# Patient Record
Sex: FEMALE | Race: BLACK OR AFRICAN AMERICAN | NOT HISPANIC OR LATINO
[De-identification: names, ages, dates, MRNs, and addresses within clinical notes are randomized per-mention and may not be internally consistent; named-entity substitution may affect disease eponyms.]

---

## 2017-03-03 ENCOUNTER — PRIOR ORIGINAL RECORDS (OUTPATIENT)
Dept: OTHER | Age: 67
End: 2017-03-03

## 2017-03-03 LAB
BASO+EOS+MONOS # BLD: 0.3 K/MCL (ref 0.1–1.1)
BASO+EOS+MONOS NFR BLD: 8 %
BASOPHILS # BLD: 0 K/MCL (ref 0–0.3)
BASOPHILS NFR BLD: 1 % (ref 0–2)
DIFFERENTIAL METHOD BLD: ABNORMAL
DIFFERENTIAL METHOD BLD: ABNORMAL
EOSINOPHIL # BLD: 0.1 K/MCL (ref 0.1–0.5)
EOSINOPHIL NFR BLD: 3 % (ref 0–6)
ERYTHROCYTE [DISTWIDTH] IN BLOOD: 16.6 % (ref 11–15)
ERYTHROCYTE [DISTWIDTH] IN BLOOD: 16.9 % (ref 11–15)
HCT VFR BLD CALC: 28.6 % (ref 36–46.5)
HCT VFR BLD CALC: 29.2 % (ref 36–46.5)
HGB BLD-MCNC: 9.4 G/DL (ref 12–15.5)
HGB BLD-MCNC: 9.5 G/DL (ref 12–15.5)
LYMPHOCYTES # BLD: 2.1 K/MCL (ref 1–4)
LYMPHOCYTES # BLD: 2.1 K/MCL (ref 1–4)
LYMPHOCYTES NFR BLD: 47 %
LYMPHOCYTES NFR BLD: 48 %
MCH RBC QN AUTO: 41 PG (ref 26–34)
MCH RBC QN AUTO: 41.9 PG (ref 26–34)
MCHC RBC AUTO-ENTMCNC: 32.2 G/DL (ref 32–36.5)
MCHC RBC AUTO-ENTMCNC: 33.2 G/DL (ref 32–36.5)
MCV RBC AUTO: 126 FL (ref 78–100)
MCV RBC AUTO: 127.5 FL (ref 78–100)
MONOCYTES # BLD: 0.2 K/MCL (ref 0.3–0.9)
MONOCYTES NFR BLD: 6 %
NEUTROPHILS # BLD: 1.9 K/MCL (ref 1.8–7.7)
NEUTROPHILS # BLD: 2 K/MCL (ref 1.8–7.7)
NEUTROPHILS NFR BLD: 43 %
NEUTROPHILS NFR BLD: 44 %
NRBC, AUTO % (NRBCRE): 0 %
PLATELET # BLD: 562 K/MCL (ref 140–450)
PLATELET # BLD: 609 K/MCL (ref 140–450)
RBC # BLD: 2.27 MIL/MCL (ref 4–5.2)
RBC # BLD: 2.29 MIL/MCL (ref 4–5.2)
WBC # BLD: 4.3 K/MCL (ref 4.2–11)
WBC # BLD: 4.4 K/MCL (ref 4.2–11)

## 2017-03-03 ASSESSMENT — PAIN SCALES - GENERAL: PAINLEVEL_OUTOF10: 5

## 2017-03-08 ENCOUNTER — HOSPITAL (OUTPATIENT)
Dept: OTHER | Age: 67
End: 2017-03-08
Attending: FAMILY MEDICINE

## 2017-03-22 ENCOUNTER — HOSPITAL (OUTPATIENT)
Dept: OTHER | Age: 67
End: 2017-03-22
Attending: FAMILY MEDICINE

## 2017-07-14 ENCOUNTER — PRIOR ORIGINAL RECORDS (OUTPATIENT)
Dept: OTHER | Age: 67
End: 2017-07-14

## 2017-07-14 LAB
BASO+EOS+MONOS # BLD: 0.4 K/MCL (ref 0.1–1.1)
BASO+EOS+MONOS NFR BLD: 7 %
DIFFERENTIAL METHOD BLD: ABNORMAL
ERYTHROCYTE [DISTWIDTH] IN BLOOD: 17.4 % (ref 11–15)
HCT VFR BLD CALC: 27 % (ref 36–46.5)
HGB BLD-MCNC: 8.9 G/DL (ref 12–15.5)
LYMPHOCYTES # BLD: 2.8 K/MCL (ref 1–4)
LYMPHOCYTES NFR BLD: 46 %
MCH RBC QN AUTO: 41.8 PG (ref 26–34)
MCHC RBC AUTO-ENTMCNC: 33 G/DL (ref 32–36.5)
MCV RBC AUTO: 126.8 FL (ref 78–100)
NEUTROPHILS # BLD: 3 K/MCL (ref 1.8–7.7)
NEUTROPHILS NFR BLD: 47 %
PLATELET # BLD: 555 K/MCL (ref 140–450)
RBC # BLD: 2.13 MIL/MCL (ref 4–5.2)
WBC # BLD: 6.2 K/MCL (ref 4.2–11)

## 2017-07-14 ASSESSMENT — PAIN SCALES - GENERAL: PAINLEVEL_OUTOF10: 4

## 2017-08-18 ENCOUNTER — CHARTING TRANS (OUTPATIENT)
Dept: OTHER | Age: 67
End: 2017-08-18

## 2018-01-12 ENCOUNTER — PRIOR ORIGINAL RECORDS (OUTPATIENT)
Dept: OTHER | Age: 68
End: 2018-01-12

## 2018-01-12 LAB
BASO+EOS+MONOS # BLD: 0.3 K/MCL (ref 0.1–1.1)
BASO+EOS+MONOS NFR BLD: 7 %
DIFFERENTIAL METHOD BLD: ABNORMAL
ERYTHROCYTE [DISTWIDTH] IN BLOOD: 16.7 % (ref 11–15)
HCT VFR BLD CALC: 29.9 % (ref 36–46.5)
HGB BLD-MCNC: 9.5 G/DL (ref 12–15.5)
LYMPHOCYTES # BLD: 2.2 K/MCL (ref 1–4)
LYMPHOCYTES NFR BLD: 41 %
MCH RBC QN AUTO: 40.8 PG (ref 26–34)
MCHC RBC AUTO-ENTMCNC: 31.8 G/DL (ref 32–36.5)
MCV RBC AUTO: 128.3 FL (ref 78–100)
NEUTROPHILS # BLD: 2.8 K/MCL (ref 1.8–7.7)
NEUTROPHILS NFR BLD: 52 %
PLATELET # BLD: 647 K/MCL (ref 140–450)
RBC # BLD: 2.33 MIL/MCL (ref 4–5.2)
WBC # BLD: 5.3 K/MCL (ref 4.2–11)

## 2018-01-12 ASSESSMENT — PAIN SCALES - GENERAL: PAINLEVEL_OUTOF10: 0

## 2018-02-20 ENCOUNTER — LAB SERVICES (OUTPATIENT)
Dept: OTHER | Age: 68
End: 2018-02-20

## 2018-02-20 ENCOUNTER — CHARTING TRANS (OUTPATIENT)
Dept: OTHER | Age: 68
End: 2018-02-20

## 2018-03-06 ENCOUNTER — CHARTING TRANS (OUTPATIENT)
Dept: OTHER | Age: 68
End: 2018-03-06

## 2018-03-06 LAB
25(OH)D3+25(OH)D2 SERPL-MCNC: 53 NG/ML (ref 30–100)
ALBUMIN SERPL-MCNC: 4.1 G/DL (ref 3.6–5.1)
ALBUMIN/GLOB SERPL: 1.1 (ref 1–2.4)
ALP SERPL-CCNC: 60 UNITS/L (ref 45–117)
ALT SERPL-CCNC: 18 UNITS/L
ANA SER QL IA: NEGATIVE
ANION GAP SERPL CALC-SCNC: 16 MMOL/L (ref 10–20)
APPEARANCE UR: NORMAL
AST SERPL-CCNC: 13 UNITS/L
BACTERIA #/AREA URNS HPF: NORMAL /HPF
BASOPHILS # BLD: 0 K/MCL (ref 0–0.3)
BASOPHILS NFR BLD: 0 %
BILIRUB SERPL-MCNC: 0.2 MG/DL (ref 0.2–1)
BILIRUB UR QL: NEGATIVE
BUN SERPL-MCNC: 13 MG/DL (ref 6–20)
BUN/CREAT SERPL: 20 (ref 7–25)
CALCIUM SERPL-MCNC: 8.8 MG/DL (ref 8.4–10.2)
CAOX CRY URNS QL MICRO: PRESENT
CCP AB SER IA-ACNC: 6 UNITS
CHLORIDE SERPL-SCNC: 109 MMOL/L (ref 98–107)
CO2 SERPL-SCNC: 23 MMOL/L (ref 21–32)
COLOR UR: YELLOW
CREAT SERPL-MCNC: 0.66 MG/DL (ref 0.51–0.95)
CRP SERPL-MCNC: <0.3 MG/DL
DIFFERENTIAL METHOD BLD: ABNORMAL
ELECTROPHORESIS (ELECTROPHO): NORMAL
ELECTROPHORESIS (ELECTROPHO): NORMAL
EOSINOPHIL # BLD: 0.1 K/MCL (ref 0.1–0.5)
EOSINOPHIL NFR BLD: 1 %
ERYTHROCYTE [DISTWIDTH] IN BLOOD: 16.5 % (ref 11–15)
ERYTHROCYTE [SEDIMENTATION RATE] IN BLOOD BY WESTERGREN METHOD: 31 MM/HR (ref 0–20)
GLOBULIN SER-MCNC: 3.8 G/DL (ref 2–4)
GLUCOSE SERPL-MCNC: 185 MG/DL (ref 65–99)
GLUCOSE UR-MCNC: NEGATIVE MG/DL
HEMATOCRIT: 29.8 % (ref 36–46.5)
HEMOGLOBIN: 9.6 G/DL (ref 12–15.5)
HYALINE CASTS #/AREA URNS LPF: NORMAL /LPF (ref 0–5)
IGA SERPL-MCNC: 436 MG/DL (ref 82–453)
IGG SERPL-MCNC: 1300 MG/DL (ref 751–1560)
IGM SERPL-MCNC: 80 MG/DL (ref 46–304)
KAPPA LC FREE SER NEPH-MCNC: 1.87 MG/DL (ref 0.33–1.94)
KAPPA LC/LAMBDA SER: 0.74 (ref 0.26–1.65)
KETONES UR-MCNC: NEGATIVE MG/DL
LAMBDA LC FREE SER NEPH-MCNC: 2.52 MG/DL (ref 0.57–2.63)
LENGTH OF FAST TIME PATIENT: 12 HRS
LYMPHOCYTES # BLD: 1.9 K/MCL (ref 1–4)
LYMPHOCYTES NFR BLD: 42 %
M PROTEIN 1 SERPL ELPH-MCNC: NORMAL G/DL
M PROTEIN 2 SERPL ELPH-MCNC: NORMAL G/DL
MEAN CORPUSCULAR HEMOGLOBIN: 41.4 PG (ref 26–34)
MEAN CORPUSCULAR HGB CONC: 32.2 G/DL (ref 32–36.5)
MEAN CORPUSCULAR VOLUME: 128.4 FL (ref 78–100)
MONOCYTES # BLD: 0.3 K/MCL (ref 0.3–0.9)
MONOCYTES NFR BLD: 7 %
MUCOUS THREADS URNS QL MICRO: PRESENT
NEUTROPHILS # BLD: 2.3 K/MCL (ref 1.8–7.7)
NEUTROPHILS NFR BLD: 50 %
NITRITE UR QL: NEGATIVE
PATH INTERP SPEC-IMP: NORMAL
PATH SERP REV: NORMAL
PH UR: 5 UNITS (ref 5–7)
PLATELET COUNT: 622 K/MCL (ref 140–450)
POTASSIUM SERPL-SCNC: 4.5 MMOL/L (ref 3.4–5.1)
PROT ?TM UR-MCNC: <6 MG/DL
PROT SERPL-MCNC: 8 G/DL (ref 6.4–8.2)
PROT UR QL: NEGATIVE MG/DL
PTH-INTACT SERPL-MCNC: 61 PG/ML (ref 19–88)
RBC #/AREA URNS HPF: NORMAL /HPF (ref 0–3)
RBC-URINE: NEGATIVE
RED CELL COUNT: 2.32 MIL/MCL (ref 4–5.2)
RF IGA SER-ACNC: 8 UNITS
RF IGG SER-ACNC: <5 UNITS
RF IGM SER-ACNC: 8 UNITS
SODIUM SERPL-SCNC: 143 MMOL/L (ref 135–145)
SP GR UR: 1.02 (ref 1–1.03)
SPECIMEN SOURCE: NORMAL
SQUAMOUS #/AREA URNS HPF: NORMAL /HPF (ref 0–5)
TOTAL PROTEIN: 7.9 G/DL (ref 6.4–8.2)
TSH SERPL-ACNC: 1.63 MCUNITS/ML (ref 0.35–5)
UROBILINOGEN UR QL: 0.2 MG/DL (ref 0–1)
WBC #/AREA URNS HPF: NORMAL /HPF (ref 0–5)
WBC-URINE: NEGATIVE
WHITE BLOOD COUNT: 4.6 K/MCL (ref 4.2–11)

## 2018-03-26 ENCOUNTER — HOSPITAL (OUTPATIENT)
Dept: OTHER | Age: 68
End: 2018-03-26
Attending: FAMILY MEDICINE

## 2018-07-06 ENCOUNTER — PRIOR ORIGINAL RECORDS (OUTPATIENT)
Dept: OTHER | Age: 68
End: 2018-07-06

## 2018-07-06 LAB
BASO+EOS+MONOS # BLD: 0.4 K/MCL (ref 0.1–1.1)
BASO+EOS+MONOS NFR BLD: 9 %
DIFFERENTIAL METHOD BLD: ABNORMAL
ERYTHROCYTE [DISTWIDTH] IN BLOOD: 16.2 % (ref 11–15)
HCT VFR BLD CALC: 29.5 % (ref 36–46.5)
HGB BLD-MCNC: 9.5 G/DL (ref 12–15.5)
LYMPHOCYTES # BLD: 1.9 K/MCL (ref 1–4)
LYMPHOCYTES NFR BLD: 36 %
MCH RBC QN AUTO: 42 PG (ref 26–34)
MCHC RBC AUTO-ENTMCNC: 32.2 G/DL (ref 32–36.5)
MCV RBC AUTO: 130.5 FL (ref 78–100)
NEUTROPHILS # BLD: 2.8 K/MCL (ref 1.8–7.7)
NEUTROPHILS NFR BLD: 55 %
PLATELET # BLD: 579 K/MCL (ref 140–450)
RBC # BLD: 2.26 MIL/MCL (ref 4–5.2)
WBC # BLD: 5.1 K/MCL (ref 4.2–11)

## 2018-07-06 ASSESSMENT — PAIN SCALES - GENERAL: PAINLEVEL_OUTOF10: 4

## 2018-08-14 ENCOUNTER — CHARTING TRANS (OUTPATIENT)
Dept: OTHER | Age: 68
End: 2018-08-14

## 2018-11-01 VITALS
DIASTOLIC BLOOD PRESSURE: 68 MMHG | HEART RATE: 90 BPM | HEIGHT: 64 IN | SYSTOLIC BLOOD PRESSURE: 122 MMHG | TEMPERATURE: 97.7 F | WEIGHT: 185 LBS | BODY MASS INDEX: 31.58 KG/M2

## 2018-11-01 VITALS
WEIGHT: 186.4 LBS | TEMPERATURE: 97.2 F | SYSTOLIC BLOOD PRESSURE: 134 MMHG | DIASTOLIC BLOOD PRESSURE: 76 MMHG | HEART RATE: 78 BPM

## 2018-11-03 VITALS
WEIGHT: 195.11 LBS | HEART RATE: 85 BPM | HEIGHT: 64 IN | BODY MASS INDEX: 33.31 KG/M2 | SYSTOLIC BLOOD PRESSURE: 131 MMHG | DIASTOLIC BLOOD PRESSURE: 72 MMHG

## 2018-11-05 ENCOUNTER — CHARTING TRANS (OUTPATIENT)
Dept: OTHER | Age: 68
End: 2018-11-05

## 2018-11-13 ENCOUNTER — CHARTING TRANS (OUTPATIENT)
Dept: OTHER | Age: 68
End: 2018-11-13

## 2018-11-27 VITALS
BODY MASS INDEX: 32.18 KG/M2 | HEIGHT: 64 IN | SYSTOLIC BLOOD PRESSURE: 136 MMHG | WEIGHT: 188.49 LBS | HEART RATE: 98 BPM | DIASTOLIC BLOOD PRESSURE: 73 MMHG

## 2018-12-07 VITALS
DIASTOLIC BLOOD PRESSURE: 79 MMHG | HEART RATE: 83 BPM | BODY MASS INDEX: 32.94 KG/M2 | SYSTOLIC BLOOD PRESSURE: 136 MMHG | WEIGHT: 191.8 LBS | RESPIRATION RATE: 16 BRPM

## 2019-01-01 ENCOUNTER — EXTERNAL RECORD (OUTPATIENT)
Dept: HEALTH INFORMATION MANAGEMENT | Facility: OTHER | Age: 69
End: 2019-01-01

## 2019-01-04 ENCOUNTER — PRIOR ORIGINAL RECORDS (OUTPATIENT)
Dept: OTHER | Age: 69
End: 2019-01-04

## 2019-01-04 LAB
BASO+EOS+MONOS # BLD: 0.5 K/MCL (ref 0.1–1.1)
BASO+EOS+MONOS NFR BLD: 11 %
DIFFERENTIAL METHOD BLD: ABNORMAL
ERYTHROCYTE [DISTWIDTH] IN BLOOD: 15.8 % (ref 11–15)
HCT VFR BLD CALC: 28.8 % (ref 36–46.5)
HGB BLD-MCNC: 9.7 G/DL (ref 12–15.5)
LYMPHOCYTES # BLD: 1.9 K/MCL (ref 1–4)
LYMPHOCYTES NFR BLD: 42 %
MCH RBC QN AUTO: 42 PG (ref 26–34)
MCHC RBC AUTO-ENTMCNC: 33.7 G/DL (ref 32–36.5)
MCV RBC AUTO: 124.7 FL (ref 78–100)
NEUTROPHILS # BLD: 2.1 K/MCL (ref 1.8–7.7)
NEUTROPHILS NFR BLD: 47 %
PLATELET # BLD: 537 K/MCL (ref 140–450)
RBC # BLD: 2.31 MIL/MCL (ref 4–5.2)
WBC # BLD: 4.5 K/MCL (ref 4.2–11)

## 2019-01-04 ASSESSMENT — PAIN SCALES - GENERAL: PAINLEVEL_OUTOF10: 0

## 2019-01-24 RX ORDER — ALPRAZOLAM 0.5 MG/1
TABLET ORAL
COMMUNITY
Start: 2018-11-13 | End: 2019-02-12 | Stop reason: ALTCHOICE

## 2019-01-24 RX ORDER — ALENDRONATE SODIUM 70 MG/1
TABLET ORAL
COMMUNITY
Start: 2018-04-01 | End: 2019-04-01

## 2019-01-24 RX ORDER — LIDOCAINE 50 MG/G
PATCH TOPICAL
COMMUNITY
Start: 2018-03-06 | End: 2019-03-06

## 2019-01-24 RX ORDER — PROPRANOLOL HYDROCHLORIDE 20 MG/1
TABLET ORAL
COMMUNITY
Start: 2018-08-14 | End: 2019-02-12 | Stop reason: SDUPTHER

## 2019-01-24 RX ORDER — GABAPENTIN 300 MG/1
CAPSULE ORAL
COMMUNITY

## 2019-02-12 ENCOUNTER — OFFICE VISIT (OUTPATIENT)
Dept: NEUROLOGY | Age: 69
End: 2019-02-12

## 2019-02-12 VITALS
HEART RATE: 75 BPM | BODY MASS INDEX: 33.22 KG/M2 | DIASTOLIC BLOOD PRESSURE: 61 MMHG | WEIGHT: 187.5 LBS | HEIGHT: 63 IN | SYSTOLIC BLOOD PRESSURE: 119 MMHG

## 2019-02-12 DIAGNOSIS — E08.42 DIABETIC POLYNEUROPATHY ASSOCIATED WITH DIABETES MELLITUS DUE TO UNDERLYING CONDITION (CMD): ICD-10-CM

## 2019-02-12 DIAGNOSIS — F41.9 ANXIETY: ICD-10-CM

## 2019-02-12 DIAGNOSIS — G25.0 BENIGN ESSENTIAL TREMOR: Primary | ICD-10-CM

## 2019-02-12 PROCEDURE — 3074F SYST BP LT 130 MM HG: CPT | Performed by: PSYCHIATRY & NEUROLOGY

## 2019-02-12 PROCEDURE — 99213 OFFICE O/P EST LOW 20 MIN: CPT | Performed by: PSYCHIATRY & NEUROLOGY

## 2019-02-12 PROCEDURE — 3078F DIAST BP <80 MM HG: CPT | Performed by: PSYCHIATRY & NEUROLOGY

## 2019-02-12 RX ORDER — ERGOCALCIFEROL 1.25 MG/1
50000 CAPSULE ORAL
COMMUNITY

## 2019-02-12 RX ORDER — NIFEDIPINE 60 MG/1
60 TABLET, FILM COATED, EXTENDED RELEASE ORAL DAILY
COMMUNITY

## 2019-02-12 RX ORDER — LOVASTATIN 40 MG/1
40 TABLET ORAL NIGHTLY
COMMUNITY

## 2019-02-12 RX ORDER — LANOLIN ALCOHOL/MO/W.PET/CERES
1000 CREAM (GRAM) TOPICAL DAILY
COMMUNITY

## 2019-02-12 RX ORDER — PROPRANOLOL HYDROCHLORIDE 20 MG/1
40 TABLET ORAL DAILY
Qty: 60 TABLET | Refills: 11 | Status: SHIPPED | OUTPATIENT
Start: 2019-02-12 | End: 2020-02-12

## 2019-02-12 RX ORDER — MELATONIN
1000 DAILY
COMMUNITY

## 2019-03-28 ENCOUNTER — HOSPITAL (OUTPATIENT)
Dept: OTHER | Age: 69
End: 2019-03-28
Attending: OBSTETRICS & GYNECOLOGY

## 2019-04-05 ENCOUNTER — HOSPITAL (OUTPATIENT)
Dept: OTHER | Age: 69
End: 2019-04-05

## 2019-07-19 ENCOUNTER — PRIOR ORIGINAL RECORDS (OUTPATIENT)
Dept: OTHER | Age: 69
End: 2019-07-19

## 2019-07-19 LAB
BASO+EOS+MONOS # BLD: 0.6 K/MCL (ref 0.1–1.1)
BASO+EOS+MONOS NFR BLD: 12 %
DIFFERENTIAL METHOD BLD: ABNORMAL
ERYTHROCYTE [DISTWIDTH] IN BLOOD: 15.5 % (ref 11–15)
HCT VFR BLD CALC: 31.5 % (ref 36–46.5)
HGB BLD-MCNC: 10.2 G/DL (ref 12–15.5)
LYMPHOCYTES # BLD: 2.2 K/MCL (ref 1–4)
LYMPHOCYTES NFR BLD: 49 %
MCH RBC QN AUTO: 41.5 PG (ref 26–34)
MCHC RBC AUTO-ENTMCNC: 32.4 G/DL (ref 32–36.5)
MCV RBC AUTO: 128 FL (ref 78–100)
NEUTROPHILS # BLD: 1.7 K/MCL (ref 1.8–7.7)
NEUTROPHILS NFR BLD: 39 %
PLATELET # BLD: 534 K/MCL (ref 140–450)
RBC # BLD: 2.46 MIL/MCL (ref 4–5.2)
WBC # BLD: 4.5 K/MCL (ref 4.2–11)

## 2019-07-19 ASSESSMENT — PAIN SCALES - GENERAL: PAINLEVEL_OUTOF10: 0

## 2019-10-14 ENCOUNTER — TELEPHONE (OUTPATIENT)
Dept: ENDOCRINOLOGY CLINIC | Facility: CLINIC | Age: 69
End: 2019-10-14

## 2019-10-14 ENCOUNTER — HOSPITAL ENCOUNTER (EMERGENCY)
Facility: HOSPITAL | Age: 69
Discharge: HOME | End: 2019-10-14
Attending: EMERGENCY MEDICINE
Payer: MEDICARE

## 2019-10-14 VITALS
OXYGEN SATURATION: 100 % | DIASTOLIC BLOOD PRESSURE: 76 MMHG | BODY MASS INDEX: 32.07 KG/M2 | WEIGHT: 181 LBS | SYSTOLIC BLOOD PRESSURE: 122 MMHG | TEMPERATURE: 98 F | HEART RATE: 73 BPM | RESPIRATION RATE: 18 BRPM | HEIGHT: 63 IN

## 2019-10-14 DIAGNOSIS — R73.9 HYPERGLYCEMIA: Primary | ICD-10-CM

## 2019-10-14 PROCEDURE — 85060 BLOOD SMEAR INTERPRETATION: CPT | Performed by: EMERGENCY MEDICINE

## 2019-10-14 PROCEDURE — 82962 GLUCOSE BLOOD TEST: CPT

## 2019-10-14 PROCEDURE — 81003 URINALYSIS AUTO W/O SCOPE: CPT | Performed by: EMERGENCY MEDICINE

## 2019-10-14 PROCEDURE — 96372 THER/PROPH/DIAG INJ SC/IM: CPT

## 2019-10-14 PROCEDURE — 96360 HYDRATION IV INFUSION INIT: CPT

## 2019-10-14 PROCEDURE — 99284 EMERGENCY DEPT VISIT MOD MDM: CPT

## 2019-10-14 PROCEDURE — 85025 COMPLETE CBC W/AUTO DIFF WBC: CPT | Performed by: EMERGENCY MEDICINE

## 2019-10-14 PROCEDURE — 80048 BASIC METABOLIC PNL TOTAL CA: CPT | Performed by: EMERGENCY MEDICINE

## 2019-10-14 RX ORDER — GLIPIZIDE 5 MG/1
5 TABLET ORAL
Qty: 60 TABLET | Refills: 0 | Status: SHIPPED | OUTPATIENT
Start: 2019-10-14 | End: 2019-11-18

## 2019-10-14 RX ORDER — INSULIN ASPART 100 [IU]/ML
0.15 INJECTION, SOLUTION INTRAVENOUS; SUBCUTANEOUS ONCE
Status: COMPLETED | OUTPATIENT
Start: 2019-10-14 | End: 2019-10-14

## 2019-10-15 NOTE — TELEPHONE ENCOUNTER
Patient was in th ER, please book consult apt with ALBER Carey  Also please call for Bg  MTf 1000 BID and Glipizide 5 mg BID  thanks

## 2019-10-15 NOTE — ED PROVIDER NOTES
Patient Seen in: [facility] AND [facility] Emergency Department    History   Patient presents with:  Hyperglycemia (metabolic)    Stated Complaint: high blood sugar     HPI    Patient complains of fatigue, polyuria and polydipsia. Has type 2 dm on metformin.   Jie Sapp murmur  GI: abdomen is soft and non tender, no masses, nl bowel sounds   EXTREMITIES: from, 5/5 strength in all 4 ext, no edema  NEURO: alert and oiented *3, 2-12 intact, no focal deficit noted  SKIN: good skin turgor, no  rashes  PSYCH: calm, cooperative, Spoke with endo will add glipizide 5mg bid fu in office.               Disposition and Plan     Clinical Impression:  Hyperglycemia  (primary encounter diagnosis)    Disposition:  Discharge  10/14/2019  8:13 pm    Follow-up:  Jazz Correa

## 2019-10-15 NOTE — ED NOTES
/76   Pulse 73   Temp 98 °F (36.7 °C)   Resp 18   Ht 160 cm (5' 3\")   Wt 82.1 kg   SpO2 100%   BMI 32.06 kg/m²     ROUNDING COMPLETED  PAIN: denies pain  WAITING: lab results  ELIMINATION: brp offered  NEEDS: no additional needs    BED LOCKED AND IN

## 2019-10-15 NOTE — TELEPHONE ENCOUNTER
Spoke to pt and relayed Dr. Reggie Galvez message as shown below. Pt states does not currently have log of blood sugars. appt offered and scheduled with Jas CAMPO on 10/24/19.  Pt states will call back with readings and states will bring them to schedu

## 2019-10-15 NOTE — ED NOTES
Patient presents with:  Hyperglycemia (metabolic)    /85   Pulse 73   Temp 98 °F (36.7 °C)   Resp 18   Ht 160 cm (5' 3\")   Wt 82.1 kg   SpO2 100%   BMI 32.06 kg/m²     PATIENT AOX3 TO ED VIA private vehicle TO ED ROOM #40.  PATIENT CO OF +hyperglycem

## 2019-10-15 NOTE — TELEPHONE ENCOUNTER
----- Message from Thalia Braswell MD sent at 10/14/2019  8:11 PM CDT -----  Mrs Jennifer Kauffman was in ER we spoke. Will start glipizide 5mg bid and metformin 1000mg bid.     Thanks  Da Videsing

## 2019-10-23 ENCOUNTER — TELEPHONE (OUTPATIENT)
Dept: ENDOCRINOLOGY CLINIC | Facility: CLINIC | Age: 69
End: 2019-10-23

## 2019-10-23 NOTE — TELEPHONE ENCOUNTER
RN verified correct fax number and assured pt ok to come to apt tomorrow. Unable to verify fax was rec'd because we are not in that location today but since fax was correct we most likely did rec it.

## 2019-10-23 NOTE — TELEPHONE ENCOUNTER
Patient has appointment tomorrow 10/24/19 and wants to ensure referral faxed to the office was received. Please call at:[phone],thanks.

## 2019-10-24 ENCOUNTER — OFFICE VISIT (OUTPATIENT)
Dept: ENDOCRINOLOGY CLINIC | Facility: CLINIC | Age: 69
End: 2019-10-24
Payer: MEDICARE

## 2019-10-24 VITALS
WEIGHT: 183.19 LBS | HEART RATE: 73 BPM | SYSTOLIC BLOOD PRESSURE: 131 MMHG | BODY MASS INDEX: 32 KG/M2 | DIASTOLIC BLOOD PRESSURE: 79 MMHG

## 2019-10-24 DIAGNOSIS — E11.65 TYPE 2 DIABETES MELLITUS WITH HYPERGLYCEMIA, WITHOUT LONG-TERM CURRENT USE OF INSULIN (HCC): Primary | ICD-10-CM

## 2019-10-24 PROBLEM — E78.5 DYSLIPIDEMIA ASSOCIATED WITH TYPE 2 DIABETES MELLITUS (HCC): Status: ACTIVE | Noted: 2019-10-24

## 2019-10-24 PROBLEM — I15.2 HYPERTENSION ASSOCIATED WITH TYPE 2 DIABETES MELLITUS (HCC): Status: ACTIVE | Noted: 2019-10-24

## 2019-10-24 PROBLEM — E11.59 HYPERTENSION ASSOCIATED WITH TYPE 2 DIABETES MELLITUS (HCC): Status: ACTIVE | Noted: 2019-10-24

## 2019-10-24 PROBLEM — E11.69 DYSLIPIDEMIA ASSOCIATED WITH TYPE 2 DIABETES MELLITUS (HCC): Status: ACTIVE | Noted: 2019-10-24

## 2019-10-24 PROBLEM — I10 ESSENTIAL HYPERTENSION: Status: ACTIVE | Noted: 2019-10-24

## 2019-10-24 PROCEDURE — 83036 HEMOGLOBIN GLYCOSYLATED A1C: CPT | Performed by: NURSE PRACTITIONER

## 2019-10-24 PROCEDURE — 82962 GLUCOSE BLOOD TEST: CPT | Performed by: NURSE PRACTITIONER

## 2019-10-24 PROCEDURE — 99204 OFFICE O/P NEW MOD 45 MIN: CPT | Performed by: NURSE PRACTITIONER

## 2019-10-24 PROCEDURE — 36416 COLLJ CAPILLARY BLOOD SPEC: CPT | Performed by: NURSE PRACTITIONER

## 2019-10-24 RX ORDER — BLOOD-GLUCOSE METER
1 EACH MISCELLANEOUS ONCE
Qty: 1 KIT | Refills: 0 | Status: SHIPPED | OUTPATIENT
Start: 2019-10-24 | End: 2019-10-24

## 2019-10-24 RX ORDER — GABAPENTIN 300 MG/1
300 CAPSULE ORAL 3 TIMES DAILY
COMMUNITY

## 2019-10-24 RX ORDER — PROPRANOLOL HYDROCHLORIDE 20 MG/1
20 TABLET ORAL 3 TIMES DAILY
COMMUNITY

## 2019-10-24 RX ORDER — NIFEDIPINE 60 MG/1
60 TABLET, FILM COATED, EXTENDED RELEASE ORAL DAILY
COMMUNITY

## 2019-10-24 RX ORDER — BLOOD SUGAR DIAGNOSTIC
STRIP MISCELLANEOUS
Qty: 300 STRIP | Refills: 1 | Status: SHIPPED | OUTPATIENT
Start: 2019-10-24 | End: 2020-10-23

## 2019-10-24 RX ORDER — OXYBUTYNIN CHLORIDE 5 MG/1
5 TABLET ORAL 3 TIMES DAILY
COMMUNITY

## 2019-10-24 RX ORDER — LANCETS
EACH MISCELLANEOUS
Qty: 3 BOX | Refills: 1 | Status: SHIPPED | OUTPATIENT
Start: 2019-10-24

## 2019-10-24 RX ORDER — FOLIC ACID 1 MG/1
1 TABLET ORAL DAILY
COMMUNITY

## 2019-10-24 RX ORDER — LOVASTATIN 40 MG/1
40 TABLET ORAL NIGHTLY
COMMUNITY

## 2019-10-24 RX ORDER — PHENOL 1.4 %
AEROSOL, SPRAY (ML) MUCOUS MEMBRANE
COMMUNITY
Start: 2019-05-03

## 2019-10-24 NOTE — PROGRESS NOTES
Hortencia Young is a 71year old female who present today w daughter  to establish for type 2 diabetes management.    Primary care physician: Delvis Otero MD   Her A1c today is 10.2% reflecting poor DM control over past 3 m   Ws in ED about 10 d ago, start NEPHRECTOMY       Social History    Tobacco Use      Smoking status: Never Smoker      Smokeless tobacco: Never Used    Alcohol use:  Yes      Alcohol/week: 1.0 standard drinks      Types: 1 Glasses of wine per week      Frequency: 2-4 times a month       0      Review of Systems   Constitutional: Positive for fatigue. HENT: Negative for dental problem. Eyes: Negative for visual disturbance. Respiratory: Negative for cough and shortness of breath. Cardiovascular: Negative for chest pain.    Joe Headley developing irreversible microvascular complications (blindness, kidney problems, nerve problems, etc) is 15% higher  for every 1% increment A1c >7%. Weight: 183 lb 3.2 oz (83.1 kg)   Diabetes control is poor.   Recommendations: will start basal insuli Placed This Encounter      POC Finger stick glucose [59986]      POC Glycohemoglobin [19861]      aspirin 81 MG Oral Tab      Calcium Carbonate 600 MG Oral Tab          Sig: Take 1 tablet BID      folic acid 1 MG Oral Tab          Sig: Take 1 mg by mouth d benefits of my recommendations, as well as other treatment options were discussed with the patient today. questions were also answered to the best of my knowledge.    50 min spent with patient and >50% time spent counseling and coordinating care related to

## 2019-10-24 NOTE — PROGRESS NOTES
Galina Shaw  : 10/18/1950 was seen for Injection Instruction:    Date: 10/24/2019 Start time: 1100  End time: 1115    /79   Pulse 73   Wt 183 lb 3.2 oz (83.1 kg)   BMI 32.45 kg/m²       Medication type, dose and frequency: Basaglar- 10 units da

## 2019-10-24 NOTE — PATIENT INSTRUCTIONS
We are here to support you with Diabetes but please remember that you still need your primary care doctor for your routine health maintenance. Your A1C: 10.2%   This is too high for you and we will work together on improving your blood sugars.    The A1C with refills. Also, please call me if you have any issues with medication questions, side effects, dosing questions or problems with your blood sugar trends BEFORE CHANGING OR STOPPING ANY MEDICATIONS.    Blood sugar testing: ordered Accucheck Guide meter

## 2019-11-13 ENCOUNTER — APPOINTMENT (OUTPATIENT)
Dept: ENDOCRINOLOGY | Facility: HOSPITAL | Age: 69
End: 2019-11-13
Attending: NURSE PRACTITIONER
Payer: COMMERCIAL

## 2019-11-18 RX ORDER — GLIPIZIDE 5 MG/1
5 TABLET ORAL
Qty: 60 TABLET | Refills: 0 | Status: SHIPPED | OUTPATIENT
Start: 2019-11-18 | End: 2019-12-18

## 2019-11-21 ENCOUNTER — OFFICE VISIT (OUTPATIENT)
Dept: ENDOCRINOLOGY CLINIC | Facility: CLINIC | Age: 69
End: 2019-11-21
Payer: MEDICARE

## 2019-11-21 VITALS
WEIGHT: 190 LBS | DIASTOLIC BLOOD PRESSURE: 81 MMHG | BODY MASS INDEX: 34 KG/M2 | SYSTOLIC BLOOD PRESSURE: 128 MMHG | HEART RATE: 78 BPM

## 2019-11-21 DIAGNOSIS — E11.65 TYPE 2 DIABETES MELLITUS WITH HYPERGLYCEMIA, WITHOUT LONG-TERM CURRENT USE OF INSULIN (HCC): Primary | ICD-10-CM

## 2019-11-21 PROCEDURE — 36416 COLLJ CAPILLARY BLOOD SPEC: CPT | Performed by: NURSE PRACTITIONER

## 2019-11-21 PROCEDURE — 99214 OFFICE O/P EST MOD 30 MIN: CPT | Performed by: NURSE PRACTITIONER

## 2019-11-21 PROCEDURE — 82962 GLUCOSE BLOOD TEST: CPT | Performed by: NURSE PRACTITIONER

## 2019-11-21 NOTE — PATIENT INSTRUCTIONS
Your trends are doing much better.    Keep up the great work   We will update the A1C at your next appointment with Dr Nguyen Yuen     Continue:   Metformin 1000mg twice daily    Glipizide 5mg twice daily - ok to take WITH the Metformin  Try to take at meal t

## 2019-11-21 NOTE — PROGRESS NOTES
Emmanuel Morrison is a 71year old female who present today for type 2 diabetes management.    Primary care physician: Ashley Rueda MD   Most recent A1C was 10.2% reflecting poor DM control over past 3 m (10-)   In the past 4 weeks her BG trends have hypertension    • Hyperlipidemia    • MDS (myelodysplastic syndrome) ([facility] [address]    • Renal disorder      Past Surgical History:   Procedure Laterality Date   • NEPHRECTOMY       Social History    Tobacco Use      Smoking status: Never Smoker      Smokeless toba HENT: Negative for dental problem. Eyes: Negative for visual disturbance. Respiratory: Negative for cough and shortness of breath. Cardiovascular: Negative for chest pain.    Gastrointestinal: Negative for nausea, abdominal pain, diarrhea and cons patient on glucose goals (Fasting < 130 and post prandial <556 ) and complications associated with hyperglycemia and uncontrolled DM  Reinforced timing and adherence with medication, self-monitoring of blood glucose and routine follow up  Patient will bene spent counseling and coordinating care related to their office visit.

## 2019-12-06 ENCOUNTER — TELEPHONE (OUTPATIENT)
Dept: ENDOCRINOLOGY CLINIC | Facility: CLINIC | Age: 69
End: 2019-12-06

## 2019-12-06 NOTE — TELEPHONE ENCOUNTER
Sent call to RN - Patient states she is taking Metformin twice a day, Glimepizide twice a day, and 10 units of insulin once per day - patient states she had been having issues with low blood sugars.   States she feels shaky with weakening legs - sugars drop

## 2019-12-06 NOTE — TELEPHONE ENCOUNTER
Laurita calling to report symptoms of hypoglycemia - for the past 3 days has felt \"shaky and like I am too weak to stand\" before lunch. Patient of Reg Martinez - last office visit 11/21/19.     Currently taking Metformin 1000 mg PO BID, Glipizide 5 mg PO BID,

## 2019-12-06 NOTE — TELEPHONE ENCOUNTER
Laurita verbalizes understanding of instruction to decrease glipizide 5 mg PO BID --> 5 mg PO with dinner only. RN discussed that she should continue checking blood sugars and call if less than 70 or if she's symptomatic.

## 2020-04-01 ENCOUNTER — HOSPITAL (OUTPATIENT)
Dept: OTHER | Age: 70
End: 2020-04-01

## 2020-05-01 ENCOUNTER — HOSPITAL (OUTPATIENT)
Dept: OTHER | Age: 70
End: 2020-05-01

## 2020-06-01 ENCOUNTER — HOSPITAL (OUTPATIENT)
Dept: OTHER | Age: 70
End: 2020-06-01

## 2020-07-01 ENCOUNTER — HOSPITAL (OUTPATIENT)
Dept: OTHER | Age: 70
End: 2020-07-01
Attending: INTERNAL MEDICINE

## 2020-10-08 VITALS
SYSTOLIC BLOOD PRESSURE: 131 MMHG | HEIGHT: 64 IN | HEART RATE: 76 BPM | BODY MASS INDEX: 32.44 KG/M2 | TEMPERATURE: 97.5 F | WEIGHT: 190.04 LBS | DIASTOLIC BLOOD PRESSURE: 72 MMHG | RESPIRATION RATE: 18 BRPM

## 2020-10-08 VITALS
HEART RATE: 91 BPM | TEMPERATURE: 98.1 F | SYSTOLIC BLOOD PRESSURE: 160 MMHG | WEIGHT: 184.3 LBS | HEIGHT: 64 IN | RESPIRATION RATE: 14 BRPM | BODY MASS INDEX: 31.47 KG/M2 | DIASTOLIC BLOOD PRESSURE: 71 MMHG

## 2020-10-08 VITALS
BODY MASS INDEX: 31.99 KG/M2 | TEMPERATURE: 98.1 F | HEART RATE: 85 BPM | WEIGHT: 187.39 LBS | HEIGHT: 64 IN | DIASTOLIC BLOOD PRESSURE: 72 MMHG | SYSTOLIC BLOOD PRESSURE: 133 MMHG | RESPIRATION RATE: 20 BRPM

## 2020-10-08 VITALS
DIASTOLIC BLOOD PRESSURE: 73 MMHG | HEART RATE: 86 BPM | SYSTOLIC BLOOD PRESSURE: 114 MMHG | BODY MASS INDEX: 31.31 KG/M2 | WEIGHT: 183.42 LBS | TEMPERATURE: 97.3 F | RESPIRATION RATE: 16 BRPM | HEIGHT: 64 IN

## 2020-10-08 VITALS
BODY MASS INDEX: 31.05 KG/M2 | SYSTOLIC BLOOD PRESSURE: 122 MMHG | WEIGHT: 181.88 LBS | RESPIRATION RATE: 16 BRPM | HEIGHT: 64 IN | HEART RATE: 81 BPM | TEMPERATURE: 97.6 F | DIASTOLIC BLOOD PRESSURE: 72 MMHG

## 2020-10-08 VITALS
DIASTOLIC BLOOD PRESSURE: 74 MMHG | SYSTOLIC BLOOD PRESSURE: 137 MMHG | TEMPERATURE: 96.4 F | HEIGHT: 64 IN | WEIGHT: 183.31 LBS | HEART RATE: 75 BPM | BODY MASS INDEX: 31.3 KG/M2 | RESPIRATION RATE: 16 BRPM

## 2021-01-01 ENCOUNTER — EXTERNAL RECORD (OUTPATIENT)
Dept: HEMATOLOGY/ONCOLOGY | Facility: HOSPITAL | Age: 71
End: 2021-01-01

## 2021-03-03 DIAGNOSIS — Z23 NEED FOR VACCINATION: ICD-10-CM

## 2021-06-10 ENCOUNTER — TELEPHONE (OUTPATIENT)
Dept: HEMATOLOGY/ONCOLOGY | Age: 71
End: 2021-06-10

## 2021-06-10 DIAGNOSIS — D46.9 MYELODYSPLASTIC SYNDROME (CMD): Primary | ICD-10-CM

## 2021-06-25 ENCOUNTER — LAB SERVICES (OUTPATIENT)
Dept: LAB | Age: 71
End: 2021-06-25

## 2021-06-25 ENCOUNTER — OFFICE VISIT (OUTPATIENT)
Dept: HEMATOLOGY/ONCOLOGY | Age: 71
End: 2021-06-25
Attending: INTERNAL MEDICINE

## 2021-06-25 VITALS
HEART RATE: 64 BPM | WEIGHT: 179.45 LBS | TEMPERATURE: 97 F | SYSTOLIC BLOOD PRESSURE: 149 MMHG | DIASTOLIC BLOOD PRESSURE: 78 MMHG | RESPIRATION RATE: 64 BRPM | BODY MASS INDEX: 31.02 KG/M2

## 2021-06-25 DIAGNOSIS — D46.9 MDS (MYELODYSPLASTIC SYNDROME) (CMD): Primary | ICD-10-CM

## 2021-06-25 DIAGNOSIS — D46.9 MYELODYSPLASTIC SYNDROME (CMD): ICD-10-CM

## 2021-06-25 LAB
BASOPHILS # BLD: 0 K/MCL (ref 0–0.3)
BASOPHILS NFR BLD: 1 %
DEPRECATED RDW RBC: 62.1 FL (ref 39–50)
EOSINOPHIL # BLD: 0.2 K/MCL (ref 0–0.5)
EOSINOPHIL NFR BLD: 3 %
ERYTHROCYTE [DISTWIDTH] IN BLOOD: 14.5 % (ref 11–15)
HCT VFR BLD CALC: 35.5 % (ref 36–46.5)
HGB BLD-MCNC: 11.8 G/DL (ref 12–15.5)
IMM GRANULOCYTES # BLD AUTO: 0 K/MCL (ref 0–0.2)
IMM GRANULOCYTES # BLD: 0 %
LYMPHOCYTES # BLD: 2.3 K/MCL (ref 1–4)
LYMPHOCYTES NFR BLD: 42 %
MCH RBC QN AUTO: 39.6 PG (ref 26–34)
MCHC RBC AUTO-ENTMCNC: 33.2 G/DL (ref 32–36.5)
MCV RBC AUTO: 119.1 FL (ref 78–100)
MONOCYTES # BLD: 0.5 K/MCL (ref 0.3–0.9)
MONOCYTES NFR BLD: 9 %
NEUTROPHILS # BLD: 2.5 K/MCL (ref 1.8–7.7)
NEUTROPHILS NFR BLD: 45 %
PLATELET # BLD AUTO: 616 K/MCL (ref 140–450)
RBC # BLD: 2.98 MIL/MCL (ref 4–5.2)
WBC # BLD: 5.7 K/MCL (ref 4.2–11)

## 2021-06-25 PROCEDURE — 99214 OFFICE O/P EST MOD 30 MIN: CPT | Performed by: INTERNAL MEDICINE

## 2021-06-25 PROCEDURE — 3078F DIAST BP <80 MM HG: CPT | Performed by: INTERNAL MEDICINE

## 2021-06-25 PROCEDURE — 36415 COLL VENOUS BLD VENIPUNCTURE: CPT | Performed by: INTERNAL MEDICINE

## 2021-06-25 PROCEDURE — 85025 COMPLETE CBC W/AUTO DIFF WBC: CPT | Performed by: INTERNAL MEDICINE

## 2021-06-25 PROCEDURE — 3077F SYST BP >= 140 MM HG: CPT | Performed by: INTERNAL MEDICINE

## 2021-06-25 RX ORDER — AMLODIPINE BESYLATE 5 MG/1
TABLET ORAL
COMMUNITY
Start: 2021-05-23 | End: 2023-07-21 | Stop reason: ALTCHOICE

## 2021-06-25 RX ORDER — PROPRANOLOL HYDROCHLORIDE 20 MG/1
20 TABLET ORAL
COMMUNITY
Start: 2019-02-21

## 2021-06-25 ASSESSMENT — PATIENT HEALTH QUESTIONNAIRE - PHQ9
SUM OF ALL RESPONSES TO PHQ9 QUESTIONS 1 AND 2: 2
CLINICAL INTERPRETATION OF PHQ9 SCORE: NO FURTHER SCREENING NEEDED
2. FEELING DOWN, DEPRESSED OR HOPELESS: SEVERAL DAYS
SUM OF ALL RESPONSES TO PHQ9 QUESTIONS 1 AND 2: 2
CLINICAL INTERPRETATION OF PHQ2 SCORE: NO FURTHER SCREENING NEEDED
1. LITTLE INTEREST OR PLEASURE IN DOING THINGS: SEVERAL DAYS

## 2021-06-25 ASSESSMENT — PAIN SCALES - GENERAL: PAINLEVEL: 0

## 2021-06-26 PROBLEM — E11.69 DYSLIPIDEMIA ASSOCIATED WITH TYPE 2 DIABETES MELLITUS (CMD): Status: ACTIVE | Noted: 2019-10-24

## 2021-06-26 PROBLEM — E11.42 DIABETIC PERIPHERAL NEUROPATHY (CMD): Status: ACTIVE | Noted: 2018-02-20

## 2021-06-26 PROBLEM — M48.061 LUMBAR SPINAL STENOSIS: Status: ACTIVE | Noted: 2018-02-20

## 2021-06-26 PROBLEM — E78.5 DYSLIPIDEMIA ASSOCIATED WITH TYPE 2 DIABETES MELLITUS (CMD): Status: ACTIVE | Noted: 2019-10-24

## 2021-06-26 PROBLEM — D47.2 MGUS (MONOCLONAL GAMMOPATHY OF UNKNOWN SIGNIFICANCE): Status: ACTIVE | Noted: 2018-04-01

## 2021-06-26 ASSESSMENT — ENCOUNTER SYMPTOMS
BRUISES/BLEEDS EASILY: 1
NEUROLOGICAL NEGATIVE: 1
RESPIRATORY NEGATIVE: 1
ALLERGIC/IMMUNOLOGIC NEGATIVE: 1
ENDOCRINE COMMENTS: NIGHT SWEATS
NERVOUS/ANXIOUS: 1
EYES NEGATIVE: 1
FATIGUE: 1
GASTROINTESTINAL NEGATIVE: 1

## 2022-07-20 ENCOUNTER — TELEPHONE (OUTPATIENT)
Dept: HEMATOLOGY/ONCOLOGY | Age: 72
End: 2022-07-20

## 2022-07-20 DIAGNOSIS — D46.9 MDS (MYELODYSPLASTIC SYNDROME) (CMD): Primary | ICD-10-CM

## 2022-07-22 ENCOUNTER — OFFICE VISIT (OUTPATIENT)
Dept: HEMATOLOGY/ONCOLOGY | Age: 72
End: 2022-07-22
Attending: INTERNAL MEDICINE

## 2022-07-22 ENCOUNTER — LAB SERVICES (OUTPATIENT)
Dept: LAB | Age: 72
End: 2022-07-22

## 2022-07-22 VITALS
WEIGHT: 179.12 LBS | DIASTOLIC BLOOD PRESSURE: 68 MMHG | SYSTOLIC BLOOD PRESSURE: 118 MMHG | TEMPERATURE: 97.1 F | HEART RATE: 69 BPM | RESPIRATION RATE: 16 BRPM | OXYGEN SATURATION: 100 % | BODY MASS INDEX: 30.96 KG/M2

## 2022-07-22 DIAGNOSIS — D46.9 MDS (MYELODYSPLASTIC SYNDROME) (CMD): ICD-10-CM

## 2022-07-22 DIAGNOSIS — D46.9 MDS (MYELODYSPLASTIC SYNDROME) (CMD): Primary | ICD-10-CM

## 2022-07-22 LAB
ALBUMIN SERPL-MCNC: 3.8 G/DL (ref 3.6–5.1)
ALBUMIN/GLOB SERPL: 1 {RATIO} (ref 1–2.4)
ALP SERPL-CCNC: 62 UNITS/L (ref 45–117)
ALT SERPL-CCNC: 29 UNITS/L
ANION GAP SERPL CALC-SCNC: 10 MMOL/L (ref 7–19)
AST SERPL-CCNC: 16 UNITS/L
BASOPHILS # BLD: 0 K/MCL (ref 0–0.3)
BASOPHILS NFR BLD: 1 %
BILIRUB SERPL-MCNC: 0.5 MG/DL (ref 0.2–1)
BUN SERPL-MCNC: 14 MG/DL (ref 6–20)
BUN/CREAT SERPL: 22 (ref 7–25)
CALCIUM SERPL-MCNC: 9.7 MG/DL (ref 8.4–10.2)
CHLORIDE SERPL-SCNC: 107 MMOL/L (ref 97–110)
CO2 SERPL-SCNC: 25 MMOL/L (ref 21–32)
CREAT SERPL-MCNC: 0.65 MG/DL (ref 0.51–0.95)
DEPRECATED RDW RBC: 61.3 FL (ref 39–50)
EOSINOPHIL # BLD: 0.2 K/MCL (ref 0–0.5)
EOSINOPHIL NFR BLD: 3 %
ERYTHROCYTE [DISTWIDTH] IN BLOOD: 14.8 % (ref 11–15)
FASTING DURATION TIME PATIENT: 0 HOURS (ref 0–999)
GFR SERPLBLD BASED ON 1.73 SQ M-ARVRAT: >90 ML/MIN
GLOBULIN SER-MCNC: 3.8 G/DL (ref 2–4)
GLUCOSE SERPL-MCNC: 174 MG/DL (ref 70–99)
HCT VFR BLD CALC: 38.1 % (ref 36–46.5)
HGB BLD-MCNC: 12.5 G/DL (ref 12–15.5)
IMM GRANULOCYTES # BLD AUTO: 0 K/MCL (ref 0–0.2)
IMM GRANULOCYTES # BLD: 0 %
LDH SERPL L TO P-CCNC: 156 UNITS/L (ref 82–240)
LYMPHOCYTES # BLD: 2.7 K/MCL (ref 1–4)
LYMPHOCYTES NFR BLD: 40 %
MCH RBC QN AUTO: 37.5 PG (ref 26–34)
MCHC RBC AUTO-ENTMCNC: 32.8 G/DL (ref 32–36.5)
MCV RBC AUTO: 114.4 FL (ref 78–100)
MONOCYTES # BLD: 0.5 K/MCL (ref 0.3–0.9)
MONOCYTES NFR BLD: 8 %
NEUTROPHILS # BLD: 3.4 K/MCL (ref 1.8–7.7)
NEUTROPHILS NFR BLD: 48 %
PLATELET # BLD AUTO: 620 K/MCL (ref 140–450)
POTASSIUM SERPL-SCNC: 4.4 MMOL/L (ref 3.4–5.1)
PROT SERPL-MCNC: 7.6 G/DL (ref 6.4–8.2)
RBC # BLD: 3.33 MIL/MCL (ref 4–5.2)
SODIUM SERPL-SCNC: 138 MMOL/L (ref 135–145)
WBC # BLD: 6.8 K/MCL (ref 4.2–11)

## 2022-07-22 PROCEDURE — 3078F DIAST BP <80 MM HG: CPT | Performed by: INTERNAL MEDICINE

## 2022-07-22 PROCEDURE — 85025 COMPLETE CBC W/AUTO DIFF WBC: CPT | Performed by: INTERNAL MEDICINE

## 2022-07-22 PROCEDURE — 36415 COLL VENOUS BLD VENIPUNCTURE: CPT | Performed by: INTERNAL MEDICINE

## 2022-07-22 PROCEDURE — 99211 OFF/OP EST MAY X REQ PHY/QHP: CPT

## 2022-07-22 PROCEDURE — 3074F SYST BP LT 130 MM HG: CPT | Performed by: INTERNAL MEDICINE

## 2022-07-22 PROCEDURE — 83615 LACTATE (LD) (LDH) ENZYME: CPT | Performed by: INTERNAL MEDICINE

## 2022-07-22 PROCEDURE — 80053 COMPREHEN METABOLIC PANEL: CPT | Performed by: INTERNAL MEDICINE

## 2022-07-22 PROCEDURE — 99213 OFFICE O/P EST LOW 20 MIN: CPT | Performed by: INTERNAL MEDICINE

## 2022-07-22 RX ORDER — GLIMEPIRIDE 2 MG/1
2 TABLET ORAL DAILY
COMMUNITY
Start: 2022-07-15 | End: 2023-03-06 | Stop reason: ALTCHOICE

## 2022-07-22 ASSESSMENT — ENCOUNTER SYMPTOMS
NERVOUS/ANXIOUS: 1
ENDOCRINE COMMENTS: NIGHT SWEATS
EYES NEGATIVE: 1
RESPIRATORY NEGATIVE: 1
ALLERGIC/IMMUNOLOGIC NEGATIVE: 1
NEUROLOGICAL NEGATIVE: 1
GASTROINTESTINAL NEGATIVE: 1
FATIGUE: 1
BRUISES/BLEEDS EASILY: 1

## 2022-07-22 ASSESSMENT — PAIN SCALES - GENERAL: PAINLEVEL: 4

## 2023-02-17 ENCOUNTER — EXTERNAL RECORD (OUTPATIENT)
Dept: HEALTH INFORMATION MANAGEMENT | Facility: OTHER | Age: 73
End: 2023-02-17

## 2023-02-20 ENCOUNTER — TELEPHONE (OUTPATIENT)
Dept: HEMATOLOGY/ONCOLOGY | Age: 73
End: 2023-02-20

## 2023-03-03 ENCOUNTER — CLINICAL ABSTRACT (OUTPATIENT)
Dept: HEALTH INFORMATION MANAGEMENT | Facility: OTHER | Age: 73
End: 2023-03-03

## 2023-03-06 ENCOUNTER — OFFICE VISIT (OUTPATIENT)
Dept: HEMATOLOGY/ONCOLOGY | Age: 73
End: 2023-03-06
Attending: INTERNAL MEDICINE

## 2023-03-06 ENCOUNTER — E-ADVICE (OUTPATIENT)
Dept: HEMATOLOGY/ONCOLOGY | Age: 73
End: 2023-03-06

## 2023-03-06 ENCOUNTER — LAB SERVICES (OUTPATIENT)
Dept: LAB | Age: 73
End: 2023-03-06

## 2023-03-06 VITALS
HEART RATE: 70 BPM | WEIGHT: 183.97 LBS | OXYGEN SATURATION: 100 % | BODY MASS INDEX: 31.8 KG/M2 | SYSTOLIC BLOOD PRESSURE: 152 MMHG | TEMPERATURE: 98.7 F | DIASTOLIC BLOOD PRESSURE: 89 MMHG | RESPIRATION RATE: 16 BRPM

## 2023-03-06 DIAGNOSIS — D46.9 MDS (MYELODYSPLASTIC SYNDROME) (CMD): ICD-10-CM

## 2023-03-06 DIAGNOSIS — D46.9 MDS (MYELODYSPLASTIC SYNDROME) (CMD): Primary | ICD-10-CM

## 2023-03-06 LAB
ALBUMIN SERPL-MCNC: 3.8 G/DL (ref 3.6–5.1)
ALBUMIN/GLOB SERPL: 0.9 {RATIO} (ref 1–2.4)
ALP SERPL-CCNC: 59 UNITS/L (ref 45–117)
ALT SERPL-CCNC: 26 UNITS/L
ANION GAP SERPL CALC-SCNC: 8 MMOL/L (ref 7–19)
AST SERPL-CCNC: 10 UNITS/L
BASOPHILS # BLD: 0 K/MCL (ref 0–0.3)
BASOPHILS NFR BLD: 1 %
BILIRUB SERPL-MCNC: 0.3 MG/DL (ref 0.2–1)
BUN SERPL-MCNC: 15 MG/DL (ref 6–20)
BUN/CREAT SERPL: 22 (ref 7–25)
CALCIUM SERPL-MCNC: 9.4 MG/DL (ref 8.4–10.2)
CHLORIDE SERPL-SCNC: 103 MMOL/L (ref 97–110)
CO2 SERPL-SCNC: 29 MMOL/L (ref 21–32)
CREAT SERPL-MCNC: 0.69 MG/DL (ref 0.51–0.95)
DEPRECATED RDW RBC: 63.8 FL (ref 39–50)
EOSINOPHIL # BLD: 0.1 K/MCL (ref 0–0.5)
EOSINOPHIL NFR BLD: 2 %
ERYTHROCYTE [DISTWIDTH] IN BLOOD: 15.2 % (ref 11–15)
FASTING DURATION TIME PATIENT: ABNORMAL H
GFR SERPLBLD BASED ON 1.73 SQ M-ARVRAT: >90 ML/MIN
GLOBULIN SER-MCNC: 4.2 G/DL (ref 2–4)
GLUCOSE SERPL-MCNC: 250 MG/DL (ref 70–99)
HCT VFR BLD CALC: 37.4 % (ref 36–46.5)
HGB BLD-MCNC: 11.8 G/DL (ref 12–15.5)
IMM GRANULOCYTES # BLD AUTO: 0 K/MCL (ref 0–0.2)
IMM GRANULOCYTES # BLD: 0 %
LDH SERPL L TO P-CCNC: 219 UNITS/L (ref 82–240)
LYMPHOCYTES # BLD: 2.3 K/MCL (ref 1–4)
LYMPHOCYTES NFR BLD: 40 %
MCH RBC QN AUTO: 36.1 PG (ref 26–34)
MCHC RBC AUTO-ENTMCNC: 31.6 G/DL (ref 32–36.5)
MCV RBC AUTO: 114.4 FL (ref 78–100)
MONOCYTES # BLD: 0.4 K/MCL (ref 0.3–0.9)
MONOCYTES NFR BLD: 6 %
NEUTROPHILS # BLD: 3 K/MCL (ref 1.8–7.7)
NEUTROPHILS NFR BLD: 51 %
NRBC BLD MANUAL-RTO: 0 /100 WBC
PLATELET # BLD AUTO: 738 K/MCL (ref 140–450)
POTASSIUM SERPL-SCNC: 4.1 MMOL/L (ref 3.4–5.1)
PROT SERPL-MCNC: 8 G/DL (ref 6.4–8.2)
RBC # BLD: 3.27 MIL/MCL (ref 4–5.2)
SODIUM SERPL-SCNC: 136 MMOL/L (ref 135–145)
WBC # BLD: 5.8 K/MCL (ref 4.2–11)

## 2023-03-06 PROCEDURE — 85025 COMPLETE CBC W/AUTO DIFF WBC: CPT | Performed by: INTERNAL MEDICINE

## 2023-03-06 PROCEDURE — 99211 OFF/OP EST MAY X REQ PHY/QHP: CPT

## 2023-03-06 PROCEDURE — 36415 COLL VENOUS BLD VENIPUNCTURE: CPT | Performed by: INTERNAL MEDICINE

## 2023-03-06 PROCEDURE — 3077F SYST BP >= 140 MM HG: CPT | Performed by: INTERNAL MEDICINE

## 2023-03-06 PROCEDURE — 83615 LACTATE (LD) (LDH) ENZYME: CPT | Performed by: INTERNAL MEDICINE

## 2023-03-06 PROCEDURE — 80053 COMPREHEN METABOLIC PANEL: CPT | Performed by: INTERNAL MEDICINE

## 2023-03-06 PROCEDURE — 99214 OFFICE O/P EST MOD 30 MIN: CPT | Performed by: INTERNAL MEDICINE

## 2023-03-06 PROCEDURE — 3079F DIAST BP 80-89 MM HG: CPT | Performed by: INTERNAL MEDICINE

## 2023-03-06 ASSESSMENT — PATIENT HEALTH QUESTIONNAIRE - PHQ9
CLINICAL INTERPRETATION OF PHQ2 SCORE: NO FURTHER SCREENING NEEDED
SUM OF ALL RESPONSES TO PHQ9 QUESTIONS 1 AND 2: 0
SUM OF ALL RESPONSES TO PHQ9 QUESTIONS 1 AND 2: 0
2. FEELING DOWN, DEPRESSED OR HOPELESS: NOT AT ALL
1. LITTLE INTEREST OR PLEASURE IN DOING THINGS: NOT AT ALL

## 2023-03-06 ASSESSMENT — ENCOUNTER SYMPTOMS
RESPIRATORY NEGATIVE: 1
BRUISES/BLEEDS EASILY: 1
ENDOCRINE COMMENTS: NIGHT SWEATS
GASTROINTESTINAL NEGATIVE: 1
NERVOUS/ANXIOUS: 1
EYES NEGATIVE: 1
ALLERGIC/IMMUNOLOGIC NEGATIVE: 1
NEUROLOGICAL NEGATIVE: 1
FATIGUE: 1

## 2023-03-06 ASSESSMENT — PAIN SCALES - GENERAL: PAINLEVEL: 9

## 2023-03-13 NOTE — H&P
Progress Notes  - documented in this encounter  Jena Pennington MD - 03/08/2023 12:00 PM CST  Formatting of this note is different from the original.  Subjective:   Patient ID: Maribell Soriano is a 67year old female. Pre op clearance for surgery-    Planning prophylactic nailing process stress fracture noted on the x-ray. Patient has been off alendronate. Procedure planned on March 13, 2023. Past medical history of hypertension, well controlled, diabetes, well controlled, MDS with thrombocytosis, renal insufficiency and mild anemia. Patient has been evaluated per hematology/oncology and cleared for surgery. Diabetes  She presents for her follow-up diabetic visit. She has type 2 diabetes mellitus. Her disease course has been fluctuating. Hypoglycemia symptoms include tremors. (With glimepiride) Associated symptoms include foot paresthesias. Pertinent negatives for diabetes include no polyphagia. (Eye exam completed. Peripheral neuropathy-stable on gabapentin.) Symptoms are improving. She sees a podiatrist.Eye exam is current. Hypertension  This is a chronic problem. The current episode started more than 1 year ago. The problem has been gradually worsening since onset. The problem is controlled. There are no associated agents to hypertension. Risk factors for coronary artery disease include sedentary lifestyle. Past treatments include angiotensin blockers, calcium channel blockers and beta blockers. Component   Ref Range & Units 2 d ago Comments   Fasting Status   Sodium   135 - 145 mmol/L 136   Potassium   3.4 - 5.1 mmol/L 4.1   Chloride   97 - 110 mmol/L 103   Carbon Dioxide   21 - 32 mmol/L 29   Anion Gap   7 - 19 mmol/L 8   Glucose   70 - 99 mg/dL 250 High   BUN   6 - 20 mg/dL 15   Creatinine   0.51 - 0.95 mg/dL 0.69   Glomerular Filtration Rate   >=60 >90 eGFR results = or >60 mL/min/1.73m2 = Normal kidney function.  Estimated GFR calculated using the CKD-EPI-R (2021) equation that does not include race in the creatinine calculation. BUN/ Creatinine Ratio   7 - 25 22   Calcium   8.4 - 10.2 mg/dL 9.4   Bilirubin, Total   0.2 - 1.0 mg/dL 0.3   GOT/AST   <=37 Units/L 10   GPT/ALT   <64 Units/L 26   Alkaline Phosphatase   45 - 117 Units/L 59   Albumin   3.6 - 5.1 g/dL 3.8   Protein, Total   6.4 - 8.2 g/dL 8.0   Globulin   2.0 - 4.0 g/dL 4.2 High   A/G Ratio   1.0 - 2.4 0.9 Low     Component   Ref Range & Units 2 d ago   WBC   4.2 - 11.0 K/mcL 5.8   RBC   4.00 - 5.20 mil/mcL 3.27 Low   HGB   12.0 - 15.5 g/dL 11.8 Low   HCT   36.0 - 46.5 % 37.4   MCV   78.0 - 100.0 fl 114.4 High   MCH   26.0 - 34.0 pg 36.1 High   MCHC   32.0 - 36.5 g/dL 31.6 Low   RDW-CV   11.0 - 15.0 % 15.2 High   RDW-SD   39.0 - 50.0 fL 63.8 High   PLT   140 - 450 K/mcL 738 High   NRBC   <=0 /100 WBC 0   Neutrophil, Percent   % 51   Lymphocytes, Percent   % 40   Mono, Percent   % 6   Eosinophils, Percent   % 2   Basophils, Percent   % 1   Immature Granulocytes   % 0   Absolute Neutrophils   1.8 - 7.7 K/mcL 3.0   Absolute Lymphocytes   1.0 - 4.0 K/mcL 2.3   Absolute Monocytes   0.3 - 0.9 K/mcL 0.4   Absolute Eosinophils   0.0 - 0.5 K/mcL 0.1   Absolute Basophils   0.0 - 0.3 K/mcL 0.0   Absolute Immature Granulocytes   0.0 - 0.2 K/mcL 0.0     History/Other:   Review of Systems   Constitutional: Negative. HENT: Negative. Eyes: Negative. Respiratory: Negative. Cardiovascular: Negative. Gastrointestinal: Negative. Endocrine: Negative. Negative for polyphagia. Genitourinary: Negative. Musculoskeletal: Positive for arthralgias and gait problem. Skin: Negative. Allergic/Immunologic: Negative. Neurological: Positive for tremors. Hematological: Negative. Psychiatric/Behavioral: Negative. Current Outpatient Medications   Medication Sig Dispense Refill   acetaminophen 500 MG Oral Tab Take 2 tablets (1000 mg total) by mouth every 8 (eight) hours as needed for pain.  60 tablet 0   celecoxib (CELEBREX) 200 MG Oral Cap Take 1 capsule (200 mg total) by mouth daily. 30 capsule 0   traMADol 50 MG Oral Tab Take 1 tablets by mouth every 4-6 hours as needed for pain. 30 tablet 0   oxyCODONE 5 MG Oral Tab Take 1 tablets by mouth every 4 to 6 hours as needed for severe pain. (Can alternate with Tramadol). 10 tablet 0   Cholecalciferol (VITAMIN D) 50 MCG (2000 UT) Oral Cap Take by mouth daily. Calcium Citrate-Vitamin D (CALCIUM CITRATE + D OR) Take 648 mg by mouth 2 (two) times a day. prednisoLONE 1 % Ophthalmic Suspension Place 1 drop into the left eye 4 (four) times daily. For use after surgery 5 mL 0   polymyxin B-trimethoprim (POLYTRIM) 94294-1.1 UNIT/ML-% Ophthalmic Solution Apply 1 drop to eye 4 (four) times daily. For use after surgery. 5 mL 0   tobramycin 0.3 % Ophthalmic Solution Apply 1 drop to eye 4 (four) times daily. For use after surgery 5 mL 0   valsartan 160 MG Oral Tab Take 1 tablet (160 mg total) by mouth daily. 90 tablet 1   NIFEdipine ER 30 MG Oral Tablet 24 Hr Take 1 tablet (30 mg total) by mouth daily. Stop nifedipine 60mgs 90 tablet 0   Lovastatin 40 MG Oral Tab Take 1 tablet (40 mg total) by mouth nightly. 90 tablet 1   gabapentin 300 MG Oral Cap Take 1 capsule (300 mg total) by mouth 3 (three) times daily. Take 1 capsule by mouth 3 times a day for 90 days 270 capsule 1   metFORMIN HCl 1000 MG Oral Tab Take 1 tablet (1,000 mg total) by mouth 2 (two) times daily with meals. 180 tablet 1   aspirin 81 MG Oral Chew Tab Chew 1 tablet (81 mg total) by mouth daily. 90 tablet 1   Propranolol HCl 60 MG Oral Tab Take 1 tablet (60 mg total) by mouth 2 (two) times daily. 925 tablet 1   folic acid 1 MG Oral Tab Take 1 mg by mouth daily. Oxybutynin Chloride 5 MG Oral Tab Take 5 mg by mouth 3 (three) times daily.    ACCU-CHEK FASTCLIX LANCETS Does not apply Misc Test 3 x daily 3 Box 1     Allergies:  Ciprofloxacin HIVES  Comment:Hives    HISTORY:  Past Medical History:   Diagnosis Date   Cancer ([facility] 1982   renal carcinoma   Diabetes ([facility] Essential hypertension   Hyperlipidemia   MDS (myelodysplastic syndrome) (HCC)   Renal disorder     Past Surgical History:   Procedure Laterality Date   COLONOSCOPY   HYSTERECTOMY   NEPHRECTOMY Right 1982   renal cancer     Family History   Problem Relation Age of Onset   Diabetes Mother   No Known Problems Daughter     Social History: Social History  Tobacco Use  Smoking status: Never  Smokeless tobacco: Never  Vaping Use  Vaping Use: Never used  Alcohol use: Yes  Alcohol/week: 1.0 standard drink  Types: 1 Glasses of wine per week  Comment: wine  Drug use: Never      Objective:   Physical Exam  Vitals and nursing note reviewed. Constitutional:   Appearance: Normal appearance. HENT:   Head: Normocephalic. Right Ear: Tympanic membrane normal.   Left Ear: Tympanic membrane normal.   Mouth/Throat:   Pharynx: No oropharyngeal exudate. Eyes:   General: No scleral icterus. Extraocular Movements: Extraocular movements intact. Conjunctiva/sclera: Conjunctivae normal.   Pupils: Pupils are equal, round, and reactive to light. Neck:   Vascular: No JVD. Cardiovascular:   Rate and Rhythm: Normal rate and regular rhythm. Heart sounds: Normal heart sounds. No murmur heard. Pulmonary:   Effort: Pulmonary effort is normal. No respiratory distress. Breath sounds: Normal breath sounds. No wheezing or rales. Chest:   Chest wall: No tenderness. Abdominal:   General: Bowel sounds are normal. There is no distension. Palpations: There is no mass. Tenderness: There is no abdominal tenderness. There is no rebound. Musculoskeletal:   General: No tenderness. Normal range of motion. Cervical back: Neck supple. Right lower leg: No edema. Left lower leg: No edema. Skin:  General: Skin is warm and dry. Coloration: Skin is not jaundiced. Findings: No erythema or rash. Neurological:   General: No focal deficit present. Mental Status: She is alert and oriented to person, place, and time.    Cranial Nerves: No cranial nerve deficit. Sensory: No sensory deficit. Motor: No abnormal muscle tone. Coordination: Coordination normal.   Gait: Gait normal.   Deep Tendon Reflexes: Reflexes normal.   Psychiatric:   Mood and Affect: Mood normal.   Behavior: Behavior normal.   Thought Content: Thought content normal.     Assessment & Plan:   Preoperative clearance (primary encounter diagnosis)  Preop exam for internal medicine  MGUS (monoclonal gammopathy of unknown significance)  Thrombocytosis  Type 2 diabetes mellitus with hyperglycemia, without long-term current use of insulin ([facility] [address]  Hypertension associated with type 2 diabetes mellitus ([facility] [address]  Atypical fracture of femur, initial encounter  Problem List Items Addressed This Visit       Cardiovascular   Hypertension associated with type 2 diabetes mellitus (HCC)   Blood pressure 136/70, pulse 70, height 63\", weight 184 lb 6.4 oz (83.6 kg), SpO2 99 %. Blood pressures look stable, well controlled at this time on nifedipine 30 mg daily, valsartan 160 mg daily and propranolol 60 mg twice daily. Heart rate remains stable. Advised to reduce salt in the diet, keep well-hydrated and monitor labs. Endocrine   Type 2 diabetes mellitus with hyperglycemia, without long-term current use of insulin ([facility])   Due to diabetes, hemoglobin A1c stable at 7.7. Last completed in January 2023. Next labs due in April. She is currently on metformin 500 mg twice daily. Intolerant of glimepiride-severe hypoglycemia, [place] and [place]. Musculoskeletal   Atypical fracture of femur, initial encounter   Patient diagnosed with right proximal femoral bisphosphonates related stress fracture diagnosed in February 2023. She is ambulatory with a walker. Pain has been managed with Tylenol. She has not yet started on Celebrex. She is being scheduled for prophylactic nailing of the right femur on March 13 per Dr. Rachna Lopez. Labs reviewed.   Patient has been evaluated per outside oncologist-Dr. Nancy Monsalve, mild anemia and thrombocytosis of 7 38,000 has been reviewed. This has been secondary to her myelodysplastic syndrome. She has been advised to stop aspirin 5 days prior to surgery both by orthopedics as well as by heme-onc. Repeat labs within about 6 to 12 weeks after surgery for monitoring. She has been off bisphosphonates at this time. Immune   MGUS (monoclonal gammopathy of unknown significance)   MGUS stable at this time, no interventions needed. Continue to monitor as per hematology recommendations. Hematologic   Thrombocytosis   History of myelodysplastic syndrome and thrombocytosis with persistent elevation in the platelet counts and mild anemia. She has been evaluated per Dr. Memo Smith and cleared for surgery. She has been advised to hold her aspirin 5 days prior to surgery. Other   Preoperative clearance - Primary   Patient being evaluated today for preop clearance for right femoral stress fracture-prophylactic nailing. Procedure planned on March 13, 2022 per Dr. Herson Medina. Patient is aware of risks involved. She has been off Fosamax. She has been evaluated by the fracture clinic and is not on any medications at this time. Patient has had a normal EKG completed on February 13, 2023. She does not have any chest pain, palpitations at this time. Echocardiogram did show moderate mitral regurgitation and will be monitored. Blood pressures are well controlled. She is medically cleared for surgery. Labs for medical clearance including chest x-ray have been ordered.   CBC and CMP completed at [facility] recently was normal.        Relevant Orders   PT AND PTT (Completed)     Other Visit Diagnoses     Preop exam for internal medicine   Relevant Orders   URINALYSIS, COMPLETE WITH MICROSCOPIC EXAMINATION WITH REFLEX TO URINE CULTURE, ROUTINE (Completed)   XR CHEST PA + LAT CHEST (CPT=71046) (Completed)       EKG completed in the office looks normal.  Urinalysis and culture-negative for infection. Labs look stable . Chest x-ray looks normal.    The patient indicates understanding of these issues and agrees to the plan. Return in about 3 weeks (around 3/29/2023), or if symptoms worsen or fail to improve.    Orders Placed This Encounter  Urinalysis, Complete With Microscopic Examination With Reflex to Urine Culture, Routine  PT AND PTT    Meds This Visit:  Requested Prescriptions     No prescriptions requested or ordered in this encounter     Imaging & Referrals:  None  Electronically signed by Chico Olmedo MD at 03/13/2023 1:38 AM CDT  No change in H and P dr Vyas Later 3/8/2023

## 2023-03-13 NOTE — ANESTHESIA PROCEDURE NOTES
Airway  Date/Time: 3/13/2023 2:32 PM  Urgency: Elective    Airway not difficult    General Information and Staff    Patient location during procedure: OR  Anesthesiologist: Vignesh Hubbard MD  Performed: anesthesiologist   Performed by: Vignesh Hubbard MD  Authorized by: Vignesh Hubbard MD      Indications and Patient Condition  Indications for airway management: anesthesia  Spontaneous ventilation: present  Sedation level: deep  Preoxygenated: yes  Patient position: sniffing  Mask difficulty assessment: 1 - vent by mask    Final Airway Details  Final airway type: endotracheal airway      Successful airway: ETT  Cuffed: yes   Successful intubation technique: direct laryngoscopy  Endotracheal tube insertion site: oral  Blade: Angélica  Blade size: #3  ETT size (mm): 7.0    Cormack-Lehane Classification: grade I - full view of glottis  Placement verified by: chest auscultation and capnometry   Cuff volume (mL): 6  Measured from: teeth  ETT to teeth (cm): 21  Number of attempts at approach: 1  Number of other approaches attempted: 0

## 2023-03-13 NOTE — BRIEF OP NOTE
Pre-Operative Diagnosis: Atypical fracture of femur     Post-Operative Diagnosis: Atypical fracture of femur      Procedure Performed:   Right hip intramedullary long nail    Surgeon(s) and Role:     * Evette Canseco DO - Primary    Assistant(s):  Surgical Assistant.: Edgardo Frausto CSA     Surgical Findings:      Specimen:      Estimated Blood Loss: Blood Output: 75 mL (3/13/2023  3:42 PM)      Dictation Number:      Joselito Ambrose DO  3/13/2023  4:33 PM

## 2023-03-13 NOTE — INTERVAL H&P NOTE
Pre-op Diagnosis: Atypical fracture of femur    The above referenced H&P was reviewed by Dinorah Mani DO on 3/13/2023, the patient was examined and no significant changes have occurred in the patient's condition since the H&P was performed. I discussed with the patient and/or legal representative the potential benefits, risks and side effects of this procedure; the likelihood of the patient achieving goals; and potential problems that might occur during recuperation. I discussed reasonable alternatives to the procedure, including risks, benefits and side effects related to the alternatives and risks related to not receiving this procedure. We will proceed with procedure as planned.

## 2023-03-13 NOTE — PLAN OF CARE
Marcos Manual is post op today. S/P Right hip IM nailing- Incisions x3 w mepilex in place. Hx DM- has neuropathy- some decreased sensation to LE. Alert and 0x4. IV infusing. Check post op voids. Oxy IR given for pain. BG Ac/Hs- 133 upon arrival to floor. Daughter at bedside. Tele box #73 in place- scd's x 2- will start lovenox for DVT proph. Plan is Home w [facility] when medically cleared. Problem: PAIN - ADULT  Goal: Verbalizes/displays adequate comfort level or patient's stated pain goal  Description: INTERVENTIONS:  - Encourage pt to monitor pain and request assistance  - Assess pain using appropriate pain scale  - Administer analgesics based on type and severity of pain and evaluate response  - Implement non-pharmacological measures as appropriate and evaluate response  - Consider cultural and social influences on pain and pain management  - Manage/alleviate anxiety  - Utilize distraction and/or relaxation techniques  - Monitor for opioid side effects  - Notify MD/LIP if interventions unsuccessful or patient reports new pain  - Anticipate increased pain with activity and pre-medicate as appropriate  Outcome: Progressing     Problem: RISK FOR INFECTION - ADULT  Goal: Absence of fever/infection during anticipated neutropenic period  Description: INTERVENTIONS  - Monitor WBC  - Administer growth factors as ordered  - Implement neutropenic guidelines  Outcome: Progressing     Problem: SAFETY ADULT - FALL  Goal: Free from fall injury  Description: INTERVENTIONS:  - Assess pt frequently for physical needs  - Identify cognitive and physical deficits and behaviors that affect risk of falls.   - [place] fall precautions as indicated by assessment.  - Educate pt/family on patient safety including physical limitations  - Instruct pt to call for assistance with activity based on assessment  - Modify environment to reduce risk of injury  - Provide assistive devices as appropriate  - Consider OT/PT consult to assist with strengthening/mobility  - Encourage toileting schedule  Outcome: Progressing     Problem: DISCHARGE PLANNING  Goal: Discharge to home or other facility with appropriate resources  Description: INTERVENTIONS:  - Identify barriers to discharge w/pt and caregiver  - Include patient/family/discharge partner in discharge planning  - Arrange for needed discharge resources and transportation as appropriate  - Identify discharge learning needs (meds, wound care, etc)  - Arrange for interpreters to assist at discharge as needed  - Consider post-discharge preferences of patient/family/discharge partner  - Complete POLST form as appropriate  - Assess patient's ability to be responsible for managing their own health  - Refer to Case Management Department for coordinating discharge planning if the patient needs post-hospital services based on physician/LIP order or complex needs related to functional status, cognitive ability or social support system  Outcome: Progressing

## 2023-03-14 NOTE — CM/SW NOTE
[facility] accepted the pt after providing choice    Orders/F2F entered    PLAN: Home with [facility] pending med philipp Rand Sep, LSW, MSW ext.  [phone]

## 2023-03-14 NOTE — CM/SW NOTE
Department  notified of request for [facility], aidin referrals started. Assigned CM/SW to follow up with pt/family on further discharge planning.      Toshia Song   March 14, 2023   09:56

## 2023-03-14 NOTE — DISCHARGE INSTRUCTIONS
Take aspirin 81 mg two times daily for blood clot prevention for 6 weeks unless otherwise directed by Dr Cecil Butcher  Use walker any time you are up. Bear weight through the limb. Take pain meds regularly and wean off as able. Follow up w/ Dr Cecil Butcher as previously scheduled. Sometimes managing your health at home requires assistance. The [facility] team has recognized your preference to use [facility]. They can be reached by phone at [phone]. The fax number for your reference is [phone]. A representative from the home health agency will contact you or your family to schedule your first visit.

## 2023-03-14 NOTE — HOME CARE LIAISON
Discussed with patient and dtr list of [facility] providers from [facility], patient choice is [facility]. Agency reserved in [facility] and contact information placed on AVS. Financial interest disclosure provided to patient. NATY Rios updated.

## 2023-03-17 ENCOUNTER — APPOINTMENT (OUTPATIENT)
Dept: HEMATOLOGY/ONCOLOGY | Age: 73
End: 2023-03-17
Attending: INTERNAL MEDICINE

## 2023-03-24 ENCOUNTER — TELEPHONE (OUTPATIENT)
Dept: HEMATOLOGY/ONCOLOGY | Age: 73
End: 2023-03-24

## 2023-04-01 NOTE — ED QUICK NOTES
Rounding Completed    Plan of Care reviewed. Waiting for ct. Bed is locked and in lowest position. Call light within reach.

## 2023-04-01 NOTE — ED INITIAL ASSESSMENT (HPI)
Pt who is taking Aspirin 2 tabs daily came in for blood in stool with generalized abdominal pain  started last night. Denies nausea and vomiting. Pt is A/O x 4, breathing unlabored. Ambulatory with walker.

## 2023-04-01 NOTE — ED QUICK NOTES
Orders for admission, patient is aware of plan and ready to go upstairs. Any questions, please call ED RN KK at [address]. Patient Covid vaccination status: Fully vaccinated     COVID Test Ordered in ED: None    COVID Suspicion at Admission: N/A    Running Infusions:  None    Mental Status/LOC at time of transport:  AxOx4    Other pertinent information:   CIWA score: N/A   NIH score:  N/A

## 2023-04-02 NOTE — PLAN OF CARE
Patient alert and orientated x4. VSS. Receiving LR IV fluids per MD order. On IV flagyl and rocephin per MD orders. Monitoring blood glucose levels per order. Patient is on room air. PRN Tylenol given for Pain medication provided as needed. Fall precautions maintained- bed alarm on, bed locked in lowest position, call light and personal belongings within reach, non-skid socks in place to bilateral feet. Frequent rounding by nursing staff. Plan to discharge when medically cleared.              Problem: Patient Centered Care  Goal: Patient preferences are identified and integrated in the patient's plan of care  Description: Interventions:  - What would you like us to know as we care for you?  - Provide timely, complete, and accurate information to patient/family  - Incorporate patient and family knowledge, values, beliefs, and cultural backgrounds into the planning and delivery of care  - Encourage patient/family to participate in care and decision-making at the level they choose  - Honor patient and family perspectives and choices  Outcome: Progressing     Problem: PAIN - ADULT  Goal: Verbalizes/displays adequate comfort level or patient's stated pain goal  Description: INTERVENTIONS:  - Encourage pt to monitor pain and request assistance  - Assess pain using appropriate pain scale  - Administer analgesics based on type and severity of pain and evaluate response  - Implement non-pharmacological measures as appropriate and evaluate response  - Consider cultural and social influences on pain and pain management  - Manage/alleviate anxiety  - Utilize distraction and/or relaxation techniques  - Monitor for opioid side effects  - Notify MD/LIP if interventions unsuccessful or patient reports new pain  - Anticipate increased pain with activity and pre-medicate as appropriate  Outcome: Progressing     Problem: RISK FOR INFECTION - ADULT  Goal: Absence of fever/infection during anticipated neutropenic period  Description: INTERVENTIONS  - Monitor WBC  - Administer growth factors as ordered  - Implement neutropenic guidelines  Outcome: Progressing     Problem: SAFETY ADULT - FALL  Goal: Free from fall injury  Description: INTERVENTIONS:  - Assess pt frequently for physical needs  - Identify cognitive and physical deficits and behaviors that affect risk of falls.   - [place] fall precautions as indicated by assessment.  - Educate pt/family on patient safety including physical limitations  - Instruct pt to call for assistance with activity based on assessment  - Modify environment to reduce risk of injury  - Provide assistive devices as appropriate  - Consider OT/PT consult to assist with strengthening/mobility  - Encourage toileting schedule  Outcome: Progressing     Problem: DISCHARGE PLANNING  Goal: Discharge to home or other facility with appropriate resources  Description: INTERVENTIONS:  - Identify barriers to discharge w/pt and caregiver  - Include patient/family/discharge partner in discharge planning  - Arrange for needed discharge resources and transportation as appropriate  - Identify discharge learning needs (meds, wound care, etc)  - Arrange for interpreters to assist at discharge as needed  - Consider post-discharge preferences of patient/family/discharge partner  - Complete POLST form as appropriate  - Assess patient's ability to be responsible for managing their own health  - Refer to Case Management Department for coordinating discharge planning if the patient needs post-hospital services based on physician/LIP order or complex needs related to functional status, cognitive ability or social support system  Outcome: Progressing

## 2023-04-02 NOTE — ANESTHESIA POSTPROCEDURE EVALUATION
Patient: Jael Gasca    Procedure Summary     Date: 04/02/23 Room / Location: [address] [place] [address] ENDOSCOPY 01 / [address] [place] [address] ENDOSCOPY    Anesthesia Start: 6081 Anesthesia Stop: 9626    Procedure: COLONOSCOPY Diagnosis: (diverticulosis, )    Surgeons: Wilbur Bowers MD Anesthesiologist: Hayden Marin MD    Anesthesia Type: general ASA Status: 3 - Emergent          Anesthesia Type: general    Vitals Value Taken Time   /86 04/02/23 0954   Temp  04/02/23 0956   Pulse 79 04/02/23 0955   Resp 13 04/02/23 0955   SpO2 100 % 04/02/23 0955   Vitals shown include unvalidated device data.     [address] [place] [address] AN Post Evaluation:   Patient Evaluated in PACU  Patient Participation: complete - patient participated  Level of Consciousness: awake  Pain Score: 0  Pain Management: adequate  Airway Patency:patent  Dental exam unchanged from preop  Yes    Cardiovascular Status: acceptable  Respiratory Status: acceptable  Postoperative Hydration acceptable      Pippa Chong MD  4/2/2023 9:56 AM

## 2023-04-02 NOTE — PLAN OF CARE
Alert and oriented x4. Rectal bleeding. 1 assist walker to bathroom. ACHS. NPO at midnight. Golytely. Antibiotics flagyl and rocephin. Tramadol for pain. Plan for colonoscopy in morning with GI. Problem: Patient Centered Care  Goal: Patient preferences are identified and integrated in the patient's plan of care  Description: Interventions:  - What would you like us to know as we care for you?  Home with spouse  - Provide timely, complete, and accurate information to patient/family  - Incorporate patient and family knowledge, values, beliefs, and cultural backgrounds into the planning and delivery of care  - Encourage patient/family to participate in care and decision-making at the level they choose  - Honor patient and family perspectives and choices  Outcome: Progressing     Problem: PAIN - ADULT  Goal: Verbalizes/displays adequate comfort level or patient's stated pain goal  Description: INTERVENTIONS:  - Encourage pt to monitor pain and request assistance  - Assess pain using appropriate pain scale  - Administer analgesics based on type and severity of pain and evaluate response  - Implement non-pharmacological measures as appropriate and evaluate response  - Consider cultural and social influences on pain and pain management  - Manage/alleviate anxiety  - Utilize distraction and/or relaxation techniques  - Monitor for opioid side effects  - Notify MD/LIP if interventions unsuccessful or patient reports new pain  - Anticipate increased pain with activity and pre-medicate as appropriate  Outcome: Progressing     Problem: RISK FOR INFECTION - ADULT  Goal: Absence of fever/infection during anticipated neutropenic period  Description: INTERVENTIONS  - Monitor WBC  - Administer growth factors as ordered  - Implement neutropenic guidelines  Outcome: Progressing     Problem: SAFETY ADULT - FALL  Goal: Free from fall injury  Description: INTERVENTIONS:  - Assess pt frequently for physical needs  - Identify cognitive and physical deficits and behaviors that affect risk of falls.   - [place] fall precautions as indicated by assessment.  - Educate pt/family on patient safety including physical limitations  - Instruct pt to call for assistance with activity based on assessment  - Modify environment to reduce risk of injury  - Provide assistive devices as appropriate  - Consider OT/PT consult to assist with strengthening/mobility  - Encourage toileting schedule  Outcome: Progressing     Problem: DISCHARGE PLANNING  Goal: Discharge to home or other facility with appropriate resources  Description: INTERVENTIONS:  - Identify barriers to discharge w/pt and caregiver  - Include patient/family/discharge partner in discharge planning  - Arrange for needed discharge resources and transportation as appropriate  - Identify discharge learning needs (meds, wound care, etc)  - Arrange for interpreters to assist at discharge as needed  - Consider post-discharge preferences of patient/family/discharge partner  - Complete POLST form as appropriate  - Assess patient's ability to be responsible for managing their own health  - Refer to Case Management Department for coordinating discharge planning if the patient needs post-hospital services based on physician/LIP order or complex needs related to functional status, cognitive ability or social support system  Outcome: Progressing

## 2023-04-03 NOTE — PLAN OF CARE
Alert and oriented x4. Rectal bleeding. Colonoscopy done today. Hemoglobin 6.8 @ 2100. Orders to transfuse 1 PRBC. 1 assist walker to bathroom. ACHS. Plan for possible PICC line antibiotics when discharging. Problem: Patient Centered Care  Goal: Patient preferences are identified and integrated in the patient's plan of care  Description: Interventions:  - What would you like us to know as we care for you?  Home with spouse  - Provide timely, complete, and accurate information to patient/family  - Incorporate patient and family knowledge, values, beliefs, and cultural backgrounds into the planning and delivery of care  - Encourage patient/family to participate in care and decision-making at the level they choose  - Honor patient and family perspectives and choices  Outcome: Progressing     Problem: PAIN - ADULT  Goal: Verbalizes/displays adequate comfort level or patient's stated pain goal  Description: INTERVENTIONS:  - Encourage pt to monitor pain and request assistance  - Assess pain using appropriate pain scale  - Administer analgesics based on type and severity of pain and evaluate response  - Implement non-pharmacological measures as appropriate and evaluate response  - Consider cultural and social influences on pain and pain management  - Manage/alleviate anxiety  - Utilize distraction and/or relaxation techniques  - Monitor for opioid side effects  - Notify MD/LIP if interventions unsuccessful or patient reports new pain  - Anticipate increased pain with activity and pre-medicate as appropriate  Outcome: Progressing     Problem: RISK FOR INFECTION - ADULT  Goal: Absence of fever/infection during anticipated neutropenic period  Description: INTERVENTIONS  - Monitor WBC  - Administer growth factors as ordered  - Implement neutropenic guidelines  Outcome: Progressing     Problem: SAFETY ADULT - FALL  Goal: Free from fall injury  Description: INTERVENTIONS:  - Assess pt frequently for physical needs  - Identify cognitive and physical deficits and behaviors that affect risk of falls.   - [place] fall precautions as indicated by assessment.  - Educate pt/family on patient safety including physical limitations  - Instruct pt to call for assistance with activity based on assessment  - Modify environment to reduce risk of injury  - Provide assistive devices as appropriate  - Consider OT/PT consult to assist with strengthening/mobility  - Encourage toileting schedule  Outcome: Progressing     Problem: DISCHARGE PLANNING  Goal: Discharge to home or other facility with appropriate resources  Description: INTERVENTIONS:  - Identify barriers to discharge w/pt and caregiver  - Include patient/family/discharge partner in discharge planning  - Arrange for needed discharge resources and transportation as appropriate  - Identify discharge learning needs (meds, wound care, etc)  - Arrange for interpreters to assist at discharge as needed  - Consider post-discharge preferences of patient/family/discharge partner  - Complete POLST form as appropriate  - Assess patient's ability to be responsible for managing their own health  - Refer to Case Management Department for coordinating discharge planning if the patient needs post-hospital services based on physician/LIP order or complex needs related to functional status, cognitive ability or social support system  Outcome: Progressing

## 2023-04-04 NOTE — PROGRESS NOTES
TCM chart review. No TCM as patient follows with outside [facility] PCP. Encounter closing.

## 2023-04-05 NOTE — CDS QUERY
How to Answer this Query    1.) Click \"Edit Button\" on the toolbar  2.) Type an \"X\" in the bracket for the diagnosis that applies. (You may also add additional clinical details as you feel necessary to substantiate your response). 3.) Finally click \"Sign\" to complete response. Thank you     CLINICAL DOCUMENTATION CLARIFICATION FORM  Anemia Specificity     By submitting this query, we are merely seeking further clarification of documentation to accurately reflect all conditions that you are monitoring, evaluating, treating or that extend the hospitalization or utilize additional resources of care. Please utilize your independent clinical judgment when addressing the question(s) below. Dear Doctor Isa Collet  Please clarify the type of anemia:  (x )  Blood loss anemia, acute  ( )  Blood loss anemia, chronic  ( )  Chronic anemia  ( )   Deficiency anemia (please specify type)  ( )  Iron deficiency anemia  ( )  Microcytic anemia  ( )  Normocytic anemia  ( )  Other (specify)___________________  ____________________________________________________________________________   clinical Indicators:   Chief complaint:  Blood in the stool  ED Impression: Rectal bleeding    H&P: Lower GI bleed  Likely due to diverticulitis and diverticular bleed, Status post right femur surgery 2 weeks ago,   MDS  Thrombocytosis  4-3 Consults: GI:   1. Lower GI bleed  2. Diverticulitis  Consult Progress Notes: GI Lower GI bleed- cscope w/o bleeding noted. Possibly resolved diverticular bleeding; acute anemia s/p 1U ystd with over correction on Hgb  Colonoscopy;   Preop dx: LGI bleed, postop dx diverticulosis, colonoscopy grade 2 internal hemorrhoids seen  H/H 8.4/26.2  plt 724    4-2  7.4/23. 1  plt 638; 6.8/22.0 transfused  H/H  4-3 8.3/25.9  If you have any questions, please contact Clinical Documentation  Specialist:  Meng Summers RN CCDS at [phone]     Thank You!     THIS FORM IS A PERMANENT PART OF THE MEDICAL RECORD

## 2023-07-21 ENCOUNTER — LAB SERVICES (OUTPATIENT)
Dept: LAB | Age: 73
End: 2023-07-21

## 2023-07-21 ENCOUNTER — OFFICE VISIT (OUTPATIENT)
Dept: HEMATOLOGY/ONCOLOGY | Age: 73
End: 2023-07-21
Attending: INTERNAL MEDICINE

## 2023-07-21 VITALS
BODY MASS INDEX: 29.47 KG/M2 | TEMPERATURE: 97.2 F | HEART RATE: 71 BPM | RESPIRATION RATE: 16 BRPM | SYSTOLIC BLOOD PRESSURE: 132 MMHG | WEIGHT: 170.53 LBS | DIASTOLIC BLOOD PRESSURE: 71 MMHG | OXYGEN SATURATION: 98 %

## 2023-07-21 DIAGNOSIS — D46.9 MDS (MYELODYSPLASTIC SYNDROME) (CMD): Primary | ICD-10-CM

## 2023-07-21 DIAGNOSIS — D46.9 MDS (MYELODYSPLASTIC SYNDROME) (CMD): ICD-10-CM

## 2023-07-21 LAB
ALBUMIN SERPL-MCNC: 3.7 G/DL (ref 3.6–5.1)
ALBUMIN/GLOB SERPL: 0.9 {RATIO} (ref 1–2.4)
ALP SERPL-CCNC: 78 UNITS/L (ref 45–117)
ALT SERPL-CCNC: 19 UNITS/L
ANION GAP SERPL CALC-SCNC: 7 MMOL/L (ref 7–19)
AST SERPL-CCNC: 9 UNITS/L
BASOPHILS # BLD: 0 K/MCL (ref 0–0.3)
BASOPHILS NFR BLD: 0 %
BILIRUB SERPL-MCNC: 0.5 MG/DL (ref 0.2–1)
BUN SERPL-MCNC: 13 MG/DL (ref 6–20)
BUN/CREAT SERPL: 18 (ref 7–25)
CALCIUM SERPL-MCNC: 9.8 MG/DL (ref 8.4–10.2)
CHLORIDE SERPL-SCNC: 106 MMOL/L (ref 97–110)
CO2 SERPL-SCNC: 27 MMOL/L (ref 21–32)
CREAT SERPL-MCNC: 0.73 MG/DL (ref 0.51–0.95)
DEPRECATED RDW RBC: 71.9 FL (ref 39–50)
EOSINOPHIL # BLD: 0.1 K/MCL (ref 0–0.5)
EOSINOPHIL NFR BLD: 1 %
ERYTHROCYTE [DISTWIDTH] IN BLOOD: 17.8 % (ref 11–15)
FASTING DURATION TIME PATIENT: 0 HOURS (ref 0–999)
GFR SERPLBLD BASED ON 1.73 SQ M-ARVRAT: 87 ML/MIN
GLOBULIN SER-MCNC: 4 G/DL (ref 2–4)
GLUCOSE SERPL-MCNC: 193 MG/DL (ref 70–99)
HCT VFR BLD CALC: 33 % (ref 36–46.5)
HGB BLD-MCNC: 10.4 G/DL (ref 12–15.5)
IMM GRANULOCYTES # BLD AUTO: 0.1 K/MCL (ref 0–0.2)
IMM GRANULOCYTES # BLD: 0 %
LDH SERPL L TO P-CCNC: 239 UNITS/L (ref 82–240)
LYMPHOCYTES # BLD: 1.9 K/MCL (ref 1–4)
LYMPHOCYTES NFR BLD: 15 %
MCH RBC QN AUTO: 34.8 PG (ref 26–34)
MCHC RBC AUTO-ENTMCNC: 31.5 G/DL (ref 32–36.5)
MCV RBC AUTO: 110.4 FL (ref 78–100)
MONOCYTES # BLD: 0.7 K/MCL (ref 0.3–0.9)
MONOCYTES NFR BLD: 6 %
NEUTROPHILS # BLD: 9.8 K/MCL (ref 1.8–7.7)
NEUTROPHILS NFR BLD: 78 %
PLATELET # BLD AUTO: 528 K/MCL (ref 140–450)
POTASSIUM SERPL-SCNC: 4.3 MMOL/L (ref 3.4–5.1)
PROT SERPL-MCNC: 7.7 G/DL (ref 6.4–8.2)
RBC # BLD: 2.99 MIL/MCL (ref 4–5.2)
SODIUM SERPL-SCNC: 136 MMOL/L (ref 135–145)
WBC # BLD: 12.6 K/MCL (ref 4.2–11)

## 2023-07-21 PROCEDURE — 36415 COLL VENOUS BLD VENIPUNCTURE: CPT | Performed by: INTERNAL MEDICINE

## 2023-07-21 PROCEDURE — 85025 COMPLETE CBC W/AUTO DIFF WBC: CPT | Performed by: INTERNAL MEDICINE

## 2023-07-21 PROCEDURE — 83615 LACTATE (LD) (LDH) ENZYME: CPT | Performed by: INTERNAL MEDICINE

## 2023-07-21 PROCEDURE — 99211 OFF/OP EST MAY X REQ PHY/QHP: CPT

## 2023-07-21 PROCEDURE — 99214 OFFICE O/P EST MOD 30 MIN: CPT | Performed by: INTERNAL MEDICINE

## 2023-07-21 PROCEDURE — 80053 COMPREHEN METABOLIC PANEL: CPT | Performed by: INTERNAL MEDICINE

## 2023-07-21 PROCEDURE — 3078F DIAST BP <80 MM HG: CPT | Performed by: INTERNAL MEDICINE

## 2023-07-21 PROCEDURE — 3075F SYST BP GE 130 - 139MM HG: CPT | Performed by: INTERNAL MEDICINE

## 2023-07-21 ASSESSMENT — ENCOUNTER SYMPTOMS
NERVOUS/ANXIOUS: 1
EYES NEGATIVE: 1
NEUROLOGICAL NEGATIVE: 1
BRUISES/BLEEDS EASILY: 1
ALLERGIC/IMMUNOLOGIC NEGATIVE: 1
FATIGUE: 1
GASTROINTESTINAL NEGATIVE: 1
ENDOCRINE COMMENTS: NIGHT SWEATS
RESPIRATORY NEGATIVE: 1

## 2023-07-21 ASSESSMENT — PAIN SCALES - GENERAL: PAINLEVEL: 7

## 2023-10-27 ENCOUNTER — APPOINTMENT (OUTPATIENT)
Dept: HEMATOLOGY/ONCOLOGY | Age: 73
End: 2023-10-27
Attending: INTERNAL MEDICINE

## 2023-12-08 ENCOUNTER — LAB SERVICES (OUTPATIENT)
Dept: LAB | Age: 73
End: 2023-12-08

## 2023-12-08 ENCOUNTER — OFFICE VISIT (OUTPATIENT)
Dept: HEMATOLOGY/ONCOLOGY | Age: 73
End: 2023-12-08
Attending: INTERNAL MEDICINE

## 2023-12-08 VITALS
OXYGEN SATURATION: 100 % | TEMPERATURE: 97.5 F | RESPIRATION RATE: 16 BRPM | WEIGHT: 167.33 LBS | SYSTOLIC BLOOD PRESSURE: 154 MMHG | BODY MASS INDEX: 28.92 KG/M2 | DIASTOLIC BLOOD PRESSURE: 72 MMHG | HEART RATE: 70 BPM

## 2023-12-08 DIAGNOSIS — D46.9 MDS (MYELODYSPLASTIC SYNDROME) (CMD): ICD-10-CM

## 2023-12-08 DIAGNOSIS — D46.9 MDS (MYELODYSPLASTIC SYNDROME) (CMD): Primary | ICD-10-CM

## 2023-12-08 LAB
ALBUMIN SERPL-MCNC: 3.5 G/DL (ref 3.6–5.1)
ALBUMIN/GLOB SERPL: 0.8 {RATIO} (ref 1–2.4)
ALP SERPL-CCNC: 134 UNITS/L (ref 45–117)
ALT SERPL-CCNC: 61 UNITS/L
ANION GAP SERPL CALC-SCNC: 11 MMOL/L (ref 7–19)
AST SERPL-CCNC: 27 UNITS/L
BASOPHILS # BLD: 0.1 K/MCL (ref 0–0.3)
BASOPHILS NFR BLD: 1 %
BILIRUB SERPL-MCNC: 0.4 MG/DL (ref 0.2–1)
BUN SERPL-MCNC: 9 MG/DL (ref 6–20)
BUN/CREAT SERPL: 13 (ref 7–25)
CALCIUM SERPL-MCNC: 10.3 MG/DL (ref 8.4–10.2)
CHLORIDE SERPL-SCNC: 102 MMOL/L (ref 97–110)
CO2 SERPL-SCNC: 28 MMOL/L (ref 21–32)
CREAT SERPL-MCNC: 0.72 MG/DL (ref 0.51–0.95)
DEPRECATED RDW RBC: 72.9 FL (ref 39–50)
EGFRCR SERPLBLD CKD-EPI 2021: 88 ML/MIN/{1.73_M2}
EOSINOPHIL # BLD: 0.2 K/MCL (ref 0–0.5)
EOSINOPHIL NFR BLD: 2 %
ERYTHROCYTE [DISTWIDTH] IN BLOOD: 18.1 % (ref 11–15)
FASTING DURATION TIME PATIENT: ABNORMAL H
GLOBULIN SER-MCNC: 4.5 G/DL (ref 2–4)
GLUCOSE SERPL-MCNC: 246 MG/DL (ref 70–99)
HCT VFR BLD CALC: 32.2 % (ref 36–46.5)
HGB BLD-MCNC: 10.1 G/DL (ref 12–15.5)
IMM GRANULOCYTES # BLD AUTO: 0.1 K/MCL (ref 0–0.2)
IMM GRANULOCYTES # BLD: 1 %
LDH SERPL L TO P-CCNC: 259 UNITS/L (ref 82–240)
LG PLATELETS BLD QL SMEAR: PRESENT
LYMPHOCYTES # BLD: 2.4 K/MCL (ref 1–4)
LYMPHOCYTES NFR BLD: 27 %
MACROCYTES BLD QL SMEAR: NORMAL
MCH RBC QN AUTO: 34.9 PG (ref 26–34)
MCHC RBC AUTO-ENTMCNC: 31.4 G/DL (ref 32–36.5)
MCV RBC AUTO: 111.4 FL (ref 78–100)
MONOCYTES # BLD: 0.6 K/MCL (ref 0.3–0.9)
MONOCYTES NFR BLD: 6 %
NEUTROPHILS # BLD: 5.7 K/MCL (ref 1.8–7.7)
NEUTROPHILS NFR BLD: 63 %
NRBC BLD MANUAL-RTO: 1 /100 WBC
OVALOCYTES BLD QL SMEAR: NORMAL
PLATELET # BLD AUTO: 417 K/MCL (ref 140–450)
POTASSIUM SERPL-SCNC: 4.5 MMOL/L (ref 3.4–5.1)
PROT SERPL-MCNC: 8 G/DL (ref 6.4–8.2)
RBC # BLD: 2.89 MIL/MCL (ref 4–5.2)
SODIUM SERPL-SCNC: 136 MMOL/L (ref 135–145)
WBC # BLD: 8.8 K/MCL (ref 4.2–11)

## 2023-12-08 PROCEDURE — 36415 COLL VENOUS BLD VENIPUNCTURE: CPT | Performed by: INTERNAL MEDICINE

## 2023-12-08 PROCEDURE — 85025 COMPLETE CBC W/AUTO DIFF WBC: CPT | Performed by: INTERNAL MEDICINE

## 2023-12-08 PROCEDURE — 3078F DIAST BP <80 MM HG: CPT | Performed by: INTERNAL MEDICINE

## 2023-12-08 PROCEDURE — 83615 LACTATE (LD) (LDH) ENZYME: CPT | Performed by: CLINICAL MEDICAL LABORATORY

## 2023-12-08 PROCEDURE — 3077F SYST BP >= 140 MM HG: CPT | Performed by: INTERNAL MEDICINE

## 2023-12-08 PROCEDURE — 84165 PROTEIN E-PHORESIS SERUM: CPT | Performed by: CLINICAL MEDICAL LABORATORY

## 2023-12-08 PROCEDURE — 82784 ASSAY IGA/IGD/IGG/IGM EACH: CPT | Performed by: CLINICAL MEDICAL LABORATORY

## 2023-12-08 PROCEDURE — 99213 OFFICE O/P EST LOW 20 MIN: CPT | Performed by: INTERNAL MEDICINE

## 2023-12-08 PROCEDURE — 86334 IMMUNOFIX E-PHORESIS SERUM: CPT | Performed by: CLINICAL MEDICAL LABORATORY

## 2023-12-08 PROCEDURE — 83521 IG LIGHT CHAINS FREE EACH: CPT | Performed by: CLINICAL MEDICAL LABORATORY

## 2023-12-08 PROCEDURE — 99211 OFF/OP EST MAY X REQ PHY/QHP: CPT

## 2023-12-08 PROCEDURE — 80053 COMPREHEN METABOLIC PANEL: CPT | Performed by: CLINICAL MEDICAL LABORATORY

## 2023-12-08 RX ORDER — PREGABALIN 25 MG/1
25 CAPSULE ORAL 2 TIMES DAILY
COMMUNITY

## 2023-12-08 RX ORDER — DULOXETIN HYDROCHLORIDE 60 MG/1
60 CAPSULE, DELAYED RELEASE ORAL DAILY
COMMUNITY

## 2023-12-08 ASSESSMENT — PATIENT HEALTH QUESTIONNAIRE - PHQ9
7. TROUBLE CONCENTRATING ON THINGS, SUCH AS READING THE NEWSPAPER OR WATCHING TELEVISION: NOT AT ALL
SUM OF ALL RESPONSES TO PHQ9 QUESTIONS 1 AND 2: 3
1. LITTLE INTEREST OR PLEASURE IN DOING THINGS: MORE THAN HALF THE DAYS
SUM OF ALL RESPONSES TO PHQ9 QUESTIONS 1 AND 2: 3
3. TROUBLE FALLING OR STAYING ASLEEP OR SLEEPING TOO MUCH: NOT AT ALL
4. FEELING TIRED OR HAVING LITTLE ENERGY: NOT AT ALL
2. FEELING DOWN, DEPRESSED OR HOPELESS: SEVERAL DAYS
5. POOR APPETITE, WEIGHT LOSS, OR OVEREATING: MORE THAN HALF THE DAYS
9. THOUGHTS THAT YOU WOULD BE BETTER OFF DEAD, OR OF HURTING YOURSELF: NOT AT ALL
8. MOVING OR SPEAKING SO SLOWLY THAT OTHER PEOPLE COULD HAVE NOTICED. OR THE OPPOSITE, BEING SO FIGETY OR RESTLESS THAT YOU HAVE BEEN MOVING AROUND A LOT MORE THAN USUAL: NOT AT ALL
CLINICAL INTERPRETATION OF PHQ2 SCORE: FURTHER SCREENING NEEDED
6. FEELING BAD ABOUT YOURSELF - OR THAT YOU ARE A FAILURE OR HAVE LET YOURSELF OR YOUR FAMILY DOWN: NOT AT ALL
SUM OF ALL RESPONSES TO PHQ QUESTIONS 1-9: 5
CLINICAL INTERPRETATION OF PHQ9 SCORE: MILD DEPRESSION
10. IF YOU CHECKED OFF ANY PROBLEMS, HOW DIFFICULT HAVE THESE PROBLEMS MADE IT FOR YOU TO DO YOUR WORK, TAKE CARE OF THINGS AT HOME, OR GET ALONG WITH OTHER PEOPLE: NOT DIFFICULT AT ALL

## 2023-12-08 ASSESSMENT — PAIN SCALES - GENERAL: PAINLEVEL: 0

## 2023-12-09 RX ORDER — NIFEDIPINE 30 MG/1
30 TABLET, FILM COATED, EXTENDED RELEASE ORAL DAILY
Refills: 0 | Status: SHIPPED | COMMUNITY
Start: 2023-12-09

## 2023-12-09 RX ORDER — ERGOCALCIFEROL (VITAMIN D2) 50 MCG
50 CAPSULE ORAL DAILY
Status: SHIPPED | COMMUNITY
Start: 2023-12-09

## 2023-12-09 RX ORDER — PROPRANOLOL HYDROCHLORIDE 60 MG/1
60 TABLET ORAL 2 TIMES DAILY
Status: SHIPPED | COMMUNITY
Start: 2023-12-09

## 2023-12-11 LAB
ALBUMIN SERPL ELPH-MCNC: 4.2 G/DL (ref 3.5–4.9)
ALPHA 1: 0.4 G/DL (ref 0.2–0.4)
ALPHA2 GLOB SERPL ELPH-MCNC: 0.7 G/DL (ref 0.5–0.9)
B-GLOBULIN SERPL ELPH-MCNC: 0.9 G/DL (ref 0.7–1.2)
GAMMA GLOB SERPL ELPH-MCNC: 1.4 G/DL (ref 0.7–1.7)
GLOBULIN SER-MCNC: 3.4 G/DL (ref 2.1–4.2)
IGA SERPL-MCNC: 556 MG/DL (ref 70–400)
IGG SERPL-MCNC: 1200 MG/DL (ref 700–1600)
IGM SERPL-MCNC: 51 MG/DL (ref 40–230)
KAPPA LC FREE SER NEPH-MCNC: 3.66 MG/DL (ref 0.33–1.94)
KAPPA LC/LAMBDA SER: 1.38 {RATIO} (ref 0.26–1.65)
LAMBDA LC FREE SER NEPH-MCNC: 2.65 MG/DL (ref 0.57–2.63)
M PROTEIN 1 SERPL ELPH-MCNC: 0.3 G/DL
M PROTEIN 2 SERPL ELPH-MCNC: 0.2 G/DL
M PROTEIN 3 SERPL ELPH-MCNC: 0.1 G/DL
PATH INTERP BLD-IMP: ABNORMAL
PATH INTERP SPEC-IMP: ABNORMAL
PROT SERPL-MCNC: 7.6 G/DL (ref 6.4–8.2)

## 2024-04-12 ENCOUNTER — APPOINTMENT (OUTPATIENT)
Dept: LAB | Age: 74
End: 2024-04-12

## 2024-04-12 ENCOUNTER — APPOINTMENT (OUTPATIENT)
Dept: HEMATOLOGY/ONCOLOGY | Age: 74
End: 2024-04-12
Attending: INTERNAL MEDICINE

## 2024-07-11 ENCOUNTER — OFFICE VISIT (OUTPATIENT)
Dept: HEMATOLOGY/ONCOLOGY | Age: 74
End: 2024-07-11

## 2024-07-11 ENCOUNTER — LAB SERVICES (OUTPATIENT)
Dept: LAB | Age: 74
End: 2024-07-11

## 2024-07-11 VITALS
WEIGHT: 163.8 LBS | HEART RATE: 67 BPM | OXYGEN SATURATION: 95 % | TEMPERATURE: 96.5 F | BODY MASS INDEX: 28.31 KG/M2 | SYSTOLIC BLOOD PRESSURE: 133 MMHG | DIASTOLIC BLOOD PRESSURE: 70 MMHG

## 2024-07-11 DIAGNOSIS — D46.9 MDS (MYELODYSPLASTIC SYNDROME)  (CMD): ICD-10-CM

## 2024-07-11 DIAGNOSIS — D50.0 IRON DEFICIENCY ANEMIA DUE TO CHRONIC BLOOD LOSS: Primary | ICD-10-CM

## 2024-07-11 LAB
ALBUMIN SERPL-MCNC: 4 G/DL (ref 3.6–5.1)
ALBUMIN/GLOB SERPL: 1 {RATIO} (ref 1–2.4)
ALP SERPL-CCNC: 83 UNITS/L (ref 45–117)
ALT SERPL-CCNC: 25 UNITS/L
ANION GAP SERPL CALC-SCNC: 9 MMOL/L (ref 7–19)
AST SERPL-CCNC: 14 UNITS/L
BASOPHILS # BLD: 0 K/MCL (ref 0–0.3)
BASOPHILS NFR BLD: 1 %
BILIRUB SERPL-MCNC: 0.4 MG/DL (ref 0.2–1)
BUN SERPL-MCNC: 11 MG/DL (ref 6–20)
BUN/CREAT SERPL: 18 (ref 7–25)
CALCIUM SERPL-MCNC: 9.8 MG/DL (ref 8.4–10.2)
CHLORIDE SERPL-SCNC: 104 MMOL/L (ref 97–110)
CO2 SERPL-SCNC: 28 MMOL/L (ref 21–32)
CREAT SERPL-MCNC: 0.6 MG/DL (ref 0.51–0.95)
DEPRECATED RDW RBC: 73.8 FL (ref 39–50)
EGFRCR SERPLBLD CKD-EPI 2021: >90 ML/MIN/{1.73_M2}
EOSINOPHIL # BLD: 0.1 K/MCL (ref 0–0.5)
EOSINOPHIL NFR BLD: 2 %
ERYTHROCYTE [DISTWIDTH] IN BLOOD: 18.8 % (ref 11–15)
FASTING DURATION TIME PATIENT: ABNORMAL H
GLOBULIN SER-MCNC: 4 G/DL (ref 2–4)
GLUCOSE SERPL-MCNC: 185 MG/DL (ref 70–99)
HCT VFR BLD CALC: 30.6 % (ref 36–46.5)
HGB BLD-MCNC: 9.7 G/DL (ref 12–15.5)
IMM GRANULOCYTES # BLD AUTO: 0.1 K/MCL (ref 0–0.2)
IMM GRANULOCYTES # BLD: 1 %
LDH SERPL L TO P-CCNC: 321 UNITS/L (ref 82–240)
LYMPHOCYTES # BLD: 2.6 K/MCL (ref 1–4)
LYMPHOCYTES NFR BLD: 37 %
MCH RBC QN AUTO: 34.6 PG (ref 26–34)
MCHC RBC AUTO-ENTMCNC: 31.7 G/DL (ref 32–36.5)
MCV RBC AUTO: 109.3 FL (ref 78–100)
MONOCYTES # BLD: 0.5 K/MCL (ref 0.3–0.9)
MONOCYTES NFR BLD: 7 %
NEUTROPHILS # BLD: 3.6 K/MCL (ref 1.8–7.7)
NEUTROPHILS NFR BLD: 52 %
NRBC BLD MANUAL-RTO: 2 /100 WBC
PLATELET # BLD AUTO: 490 K/MCL (ref 140–450)
POTASSIUM SERPL-SCNC: 4.2 MMOL/L (ref 3.4–5.1)
PROT SERPL-MCNC: 8 G/DL (ref 6.4–8.2)
RBC # BLD: 2.8 MIL/MCL (ref 4–5.2)
SODIUM SERPL-SCNC: 137 MMOL/L (ref 135–145)
WBC # BLD: 6.9 K/MCL (ref 4.2–11)

## 2024-07-11 PROCEDURE — 83615 LACTATE (LD) (LDH) ENZYME: CPT | Performed by: INTERNAL MEDICINE

## 2024-07-11 PROCEDURE — 80053 COMPREHEN METABOLIC PANEL: CPT | Performed by: INTERNAL MEDICINE

## 2024-07-11 PROCEDURE — 85025 COMPLETE CBC W/AUTO DIFF WBC: CPT | Performed by: INTERNAL MEDICINE

## 2024-07-11 PROCEDURE — 36415 COLL VENOUS BLD VENIPUNCTURE: CPT | Performed by: INTERNAL MEDICINE

## 2024-07-11 ASSESSMENT — PATIENT HEALTH QUESTIONNAIRE - PHQ9
1. LITTLE INTEREST OR PLEASURE IN DOING THINGS: MORE THAN HALF THE DAYS
2. FEELING DOWN, DEPRESSED OR HOPELESS: MORE THAN HALF THE DAYS
CLINICAL INTERPRETATION OF PHQ2 SCORE: FURTHER SCREENING NEEDED
SUM OF ALL RESPONSES TO PHQ9 QUESTIONS 1 AND 2: 4
SUM OF ALL RESPONSES TO PHQ9 QUESTIONS 1 AND 2: 4

## 2024-07-11 ASSESSMENT — PAIN SCALES - GENERAL: PAINLEVEL: 0

## 2024-11-11 ENCOUNTER — APPOINTMENT (OUTPATIENT)
Dept: LAB | Age: 74
End: 2024-11-11

## 2024-11-11 ENCOUNTER — OFFICE VISIT (OUTPATIENT)
Dept: HEMATOLOGY/ONCOLOGY | Age: 74
End: 2024-11-11

## 2024-11-11 VITALS
RESPIRATION RATE: 16 BRPM | SYSTOLIC BLOOD PRESSURE: 133 MMHG | HEART RATE: 71 BPM | OXYGEN SATURATION: 94 % | DIASTOLIC BLOOD PRESSURE: 69 MMHG | TEMPERATURE: 97.9 F | BODY MASS INDEX: 27.42 KG/M2 | WEIGHT: 158.62 LBS

## 2024-11-11 DIAGNOSIS — D50.0 IRON DEFICIENCY ANEMIA DUE TO CHRONIC BLOOD LOSS: ICD-10-CM

## 2024-11-11 DIAGNOSIS — D47.2 MGUS (MONOCLONAL GAMMOPATHY OF UNKNOWN SIGNIFICANCE): ICD-10-CM

## 2024-11-11 DIAGNOSIS — D46.9 MDS (MYELODYSPLASTIC SYNDROME)  (CMD): ICD-10-CM

## 2024-11-11 DIAGNOSIS — D46.9 MDS (MYELODYSPLASTIC SYNDROME)  (CMD): Primary | ICD-10-CM

## 2024-11-11 LAB
ALBUMIN SERPL-MCNC: 3.8 G/DL (ref 3.4–5)
ALBUMIN/GLOB SERPL: 1 {RATIO} (ref 1–2.4)
ALP SERPL-CCNC: 69 UNITS/L (ref 45–117)
ALT SERPL-CCNC: 15 UNITS/L
ANION GAP SERPL CALC-SCNC: 8 MMOL/L (ref 7–19)
AST SERPL-CCNC: 10 UNITS/L
BASOPHILS # BLD: 0 K/MCL (ref 0–0.3)
BASOPHILS NFR BLD: 1 %
BILIRUB SERPL-MCNC: 0.5 MG/DL (ref 0.2–1)
BUN SERPL-MCNC: 11 MG/DL (ref 6–20)
BUN/CREAT SERPL: 19 (ref 7–25)
CALCIUM SERPL-MCNC: 9.4 MG/DL (ref 8.4–10.2)
CHLORIDE SERPL-SCNC: 102 MMOL/L (ref 97–110)
CO2 SERPL-SCNC: 29 MMOL/L (ref 21–32)
CREAT SERPL-MCNC: 0.59 MG/DL (ref 0.51–0.95)
DEPRECATED RDW RBC: 74.6 FL (ref 39–50)
EGFRCR SERPLBLD CKD-EPI 2021: >90 ML/MIN/{1.73_M2}
EOSINOPHIL # BLD: 0.1 K/MCL (ref 0–0.5)
EOSINOPHIL NFR BLD: 2 %
ERYTHROCYTE [DISTWIDTH] IN BLOOD: 19.1 % (ref 11–15)
FASTING DURATION TIME PATIENT: ABNORMAL H
FERRITIN SERPL-MCNC: 113 NG/ML (ref 8–252)
GLOBULIN SER-MCNC: 3.7 G/DL (ref 2–4)
GLUCOSE SERPL-MCNC: 368 MG/DL (ref 70–99)
HCT VFR BLD CALC: 30.2 % (ref 36–46.5)
HGB BLD-MCNC: 9.3 G/DL (ref 12–15.5)
IMM GRANULOCYTES # BLD AUTO: 0.1 K/MCL (ref 0–0.2)
IMM GRANULOCYTES # BLD: 1 %
IRON SATN MFR SERPL: 29 % (ref 15–45)
IRON SERPL-MCNC: 78 MCG/DL (ref 50–170)
LDH SERPL L TO P-CCNC: 330 UNITS/L (ref 82–240)
LYMPHOCYTES # BLD: 1.7 K/MCL (ref 1–4)
LYMPHOCYTES NFR BLD: 27 %
MCH RBC QN AUTO: 33.6 PG (ref 26–34)
MCHC RBC AUTO-ENTMCNC: 30.8 G/DL (ref 32–36.5)
MCV RBC AUTO: 109 FL (ref 78–100)
MONOCYTES # BLD: 0.5 K/MCL (ref 0.3–0.9)
MONOCYTES NFR BLD: 7 %
NEUTROPHILS # BLD: 4.2 K/MCL (ref 1.8–7.7)
NEUTROPHILS NFR BLD: 62 %
NRBC BLD MANUAL-RTO: 2 /100 WBC
PLATELET # BLD AUTO: 433 K/MCL (ref 140–450)
POTASSIUM SERPL-SCNC: 4.2 MMOL/L (ref 3.4–5.1)
PROT SERPL-MCNC: 7.5 G/DL (ref 6.4–8.2)
RBC # BLD: 2.77 MIL/MCL (ref 4–5.2)
SODIUM SERPL-SCNC: 135 MMOL/L (ref 135–145)
TIBC SERPL-MCNC: 269 MCG/DL (ref 250–450)
WBC # BLD: 6.6 K/MCL (ref 4.2–11)

## 2024-11-11 PROCEDURE — 84165 PROTEIN E-PHORESIS SERUM: CPT | Performed by: CLINICAL MEDICAL LABORATORY

## 2024-11-11 PROCEDURE — 80053 COMPREHEN METABOLIC PANEL: CPT | Performed by: CLINICAL MEDICAL LABORATORY

## 2024-11-11 PROCEDURE — 83521 IG LIGHT CHAINS FREE EACH: CPT | Performed by: CLINICAL MEDICAL LABORATORY

## 2024-11-11 PROCEDURE — 99213 OFFICE O/P EST LOW 20 MIN: CPT | Performed by: INTERNAL MEDICINE

## 2024-11-11 PROCEDURE — 3075F SYST BP GE 130 - 139MM HG: CPT | Performed by: INTERNAL MEDICINE

## 2024-11-11 PROCEDURE — 85025 COMPLETE CBC W/AUTO DIFF WBC: CPT | Performed by: INTERNAL MEDICINE

## 2024-11-11 PROCEDURE — 99211 OFF/OP EST MAY X REQ PHY/QHP: CPT

## 2024-11-11 PROCEDURE — 86334 IMMUNOFIX E-PHORESIS SERUM: CPT | Performed by: CLINICAL MEDICAL LABORATORY

## 2024-11-11 PROCEDURE — 3078F DIAST BP <80 MM HG: CPT | Performed by: INTERNAL MEDICINE

## 2024-11-11 PROCEDURE — 82728 ASSAY OF FERRITIN: CPT | Performed by: CLINICAL MEDICAL LABORATORY

## 2024-11-11 PROCEDURE — 82784 ASSAY IGA/IGD/IGG/IGM EACH: CPT | Performed by: CLINICAL MEDICAL LABORATORY

## 2024-11-11 PROCEDURE — 36415 COLL VENOUS BLD VENIPUNCTURE: CPT | Performed by: INTERNAL MEDICINE

## 2024-11-11 PROCEDURE — 83615 LACTATE (LD) (LDH) ENZYME: CPT | Performed by: CLINICAL MEDICAL LABORATORY

## 2024-11-11 PROCEDURE — 83550 IRON BINDING TEST: CPT | Performed by: CLINICAL MEDICAL LABORATORY

## 2024-11-11 PROCEDURE — 83540 ASSAY OF IRON: CPT | Performed by: CLINICAL MEDICAL LABORATORY

## 2024-11-11 ASSESSMENT — PAIN SCALES - GENERAL: PAINLEVEL: 0

## 2024-11-11 ASSESSMENT — PATIENT HEALTH QUESTIONNAIRE - PHQ9
SUM OF ALL RESPONSES TO PHQ9 QUESTIONS 1 AND 2: 0
SUM OF ALL RESPONSES TO PHQ9 QUESTIONS 1 AND 2: 0
2. FEELING DOWN, DEPRESSED OR HOPELESS: NOT AT ALL
CLINICAL INTERPRETATION OF PHQ2 SCORE: NO FURTHER SCREENING NEEDED
1. LITTLE INTEREST OR PLEASURE IN DOING THINGS: NOT AT ALL

## 2024-11-12 LAB
ALBUMIN SERPL ELPH-MCNC: 4.2 G/DL (ref 3.5–4.9)
ALPHA 1: 0.3 G/DL (ref 0.2–0.4)
ALPHA2 GLOB SERPL ELPH-MCNC: 0.5 G/DL (ref 0.5–0.9)
B-GLOBULIN SERPL ELPH-MCNC: 0.8 G/DL (ref 0.7–1.2)
GAMMA GLOB SERPL ELPH-MCNC: 1.3 G/DL (ref 0.7–1.7)
GLOBULIN SER-MCNC: 3 G/DL (ref 2.1–4.2)
IGA SERPL-MCNC: 501 MG/DL (ref 70–400)
IGG SERPL-MCNC: 1150 MG/DL (ref 700–1600)
IGM SERPL-MCNC: 50 MG/DL (ref 40–230)
KAPPA LC FREE SER NEPH-MCNC: 3.06 MG/DL (ref 0.33–1.94)
KAPPA LC/LAMBDA SER: 1.28 {RATIO} (ref 0.26–1.65)
LAMBDA LC FREE SER NEPH-MCNC: 2.4 MG/DL (ref 0.57–2.63)
M PROTEIN 1 SERPL ELPH-MCNC: 0.2 G/DL
M PROTEIN 2 SERPL ELPH-MCNC: 0.2 G/DL
M PROTEIN 3 SERPL ELPH-MCNC: 0.1 G/DL
PATH INTERP BLD-IMP: ABNORMAL
PATH INTERP SPEC-IMP: ABNORMAL
PROT SERPL-MCNC: 7.2 G/DL (ref 6.4–8.2)

## 2024-11-22 NOTE — ED INITIAL ASSESSMENT (HPI)
Pt reports glucose to 500s at home. C/o dry mouth. Takes metformin, denies recent changes.  No further complaints. A/ox4, respirations unlabored, speech full/clear, gait steady, no acute distress noted.

## 2024-11-23 NOTE — DISCHARGE INSTRUCTIONS
Follow up with your endocrinologist.  Check your sugars twice daily and record in a journal.   PATIENT: Valerie Zhu    : 1941  DOS:     A [facility] healthcare ministry serving [place] and [place] [address]    DATE OF BIRTH: [place]. Vital signs have been stable. See [facility] for trends. HPI: Patient is seen today for monthly followup visit for chronic conditions, including history of  bradycardia, longstanding Sweet catheter, as well as left great toe ingrown toenail. Her toenail  appears to be improving overall. No new pus, discharge noted. She is planning on seeing  Podiatry. Is on list. We will continue to monitor routinely after she is seen and treated. Currently, no new exudate or fluid/pus noted as part of the ingrown toenail; however, it is  actually improving. Patient's vital signs are stable. No new bradycardic events. Her Sweet  catheter has not had any new issues since increasing gauge of Sweet. Has not slipped out or  seemed to have been as difficult to deal it or draining/leaking. Patient had significant leakage  due to smaller Sweet in the recent past. Urine appears to be clear without any issue. No new  sediment noted. Patient does have a history of recurrent UTIs as well. Patient's review of systems are mostly uneventful. She denies any acute complaints. No new  chest pain, shortness of breath, POI, or wheezing. No fever. Denies any GI or  complaints at  the moment. Is eating and drinking fairly well. Weight is stable. Denies any stress and anxiety. She is alert and oriented approximately 2/3. Somewhat disinterested and distant as far as her  daily activities go; however, no new complaints or acute issue. Remaining 14-point ROS  unremarkable. No new additional concerns per nursing staff. MEDICATIONS: Reviewed. ALLERGIES: Reviewed. REVIEW OF SYSTEMS: See HPI. Physical Exam  Vitals and nursing note reviewed. Constitutional:       General: She is not in acute distress. Appearance: Normal appearance. She is normal weight.  She is not ill-appearing. HENT:      Head: Normocephalic and atraumatic. Mouth/Throat:      Mouth: Mucous membranes are moist.      Pharynx: Oropharynx is clear. Eyes:      Extraocular Movements: Extraocular movements intact. Conjunctiva/sclera: Conjunctivae normal.   Cardiovascular:      Rate and Rhythm: Normal rate and regular rhythm. Pulses: Normal pulses. Skin:     General: Skin is warm and dry. Findings: No erythema. Neurological:      General: No focal deficit present. Mental Status: She is alert and oriented to person, place, and time. Motor: Weakness (chronic) present. Psychiatric:         Mood and Affect: Mood normal.         Behavior: Behavior normal.           ASSESSMENT AND PLAN:    1. Sweet catheter, chronic-continue current orders. Maintain a large-diameter Sweet to avoid  leakage. Overall patient is stable. 2. Bradycardia-monitor vital signs. No new changes at this time. Pulse was within normal  Limits. 3. Left great toe ingrown toenail-no new changes. Continuing awaiting Podiatry consult  and treatment.     [id number] [facility]  Amalia Mcqueen [address] [place] [address]  Phone: [phone]  Fax: [phone]    PATIENT: dAeola Jordan [address]66 serving [place] and [place]      Electronically Signed By: Tonja Lott PA-C on 08/01/2021 15:30:07  ______________________________  Tonja Lott PA-C  [id number]  D: 07/30/2021 00:27:31  T: 07/30/2021 20:39:23  cc: - [phone] [facility]

## 2024-11-23 NOTE — ED QUICK NOTES
Importance of MD follow up reviewed. Pt verbalized understanding. Importance of keeping a journal of blood sugars reviewed. Pt verbalized understanding.

## 2024-11-23 NOTE — ED PROVIDER NOTES
Patient Seen in: [facility] Emergency Department      History     Chief Complaint   Patient presents with    Hyperglycemia     Stated Complaint: High Blood sugar    Subjective:   HPI  74-year-old female with type 2 diabetes on metformin presents for evaluation of elevated blood glucose.  Over the past few days she has been feeling weak and fatigued.  She also has polyuria and polydipsia.  She feels like her mouth is very dry.  No abdominal pain, nausea or vomiting or diarrhea, dysuria or hematuria, chest pain or dyspnea, cough or congestion.  No fevers.  She does not routinely check her blood glucose but at the advice of her daughter, checked it today and it was greater than 500.  Her PCP generally manages her diabetes but occasionally she does follow-up with endocrinology.        Objective:     No pertinent past medical history.            No pertinent past surgical history.              No pertinent social history.                Physical Exam     ED Triage Vitals [11/22/24 1741]   BP (!) 171/75   Pulse 78   Resp 16   Temp 97.2 °F (36.2 °C)   Temp src    SpO2 98 %   O2 Device None (Room air)       Current Vitals:   Vital Signs  BP: 147/69  Pulse: 70  Resp: 18  Temp: 97.2 °F (36.2 °C)  MAP (mmHg): 92    Oxygen Therapy  SpO2: 99 %  O2 Device: None (Room air)        Physical Exam  Vitals and nursing note reviewed.   Constitutional:       Appearance: She is well-developed.   HENT:      Head: Normocephalic and atraumatic.   Eyes:      Extraocular Movements: Extraocular movements intact.   Cardiovascular:      Rate and Rhythm: Normal rate and regular rhythm.      Heart sounds: Normal heart sounds.   Pulmonary:      Effort: Pulmonary effort is normal.      Breath sounds: Normal breath sounds.   Abdominal:      General: There is no distension.      Palpations: Abdomen is soft.      Tenderness: There is no abdominal tenderness.   Musculoskeletal:         General: Normal range of motion.      Cervical back: Normal  range of motion.   Skin:     General: Skin is warm.      Capillary Refill: Capillary refill takes less than 2 seconds.   Neurological:      Mental Status: She is alert.      Comments: No focal deficits       Differential diagnosis includes but is not limited to DKA, HHS, electrolyte derangement, dehydration, UTI    ED Course     Labs Reviewed   CBC WITH DIFFERENTIAL WITH PLATELET - Abnormal; Notable for the following components:       Result Value    RBC 3.02 (*)     HGB 9.9 (*)     HCT 32.3 (*)     .0 (*)     MCHC 30.7 (*)     RDW-SD 72.4 (*)     RDW 18.9 (*)     .0 (*)     All other components within normal limits   COMP METABOLIC PANEL (14) - Abnormal; Notable for the following components:    Glucose 341 (*)     BUN 7 (*)     BUN/CREA Ratio 7.5 (*)     Calcium, Total 10.7 (*)     All other components within normal limits   URINALYSIS, ROUTINE - Abnormal; Notable for the following components:    Glucose Urine >1000 (*)     Ketones Urine 20 (*)     Protein Urine Trace (*)     All other components within normal limits   RBC MORPHOLOGY SCAN - Abnormal; Notable for the following components:    RBC Morphology See morphology below (*)     Platelet Morphology See morphology below (*)     Macrocytosis 2+ (*)     Giant platelets Few (*)     All other components within normal limits   POCT GLUCOSE - Abnormal; Notable for the following components:    POC Glucose  389 (*)     All other components within normal limits   POCT GLUCOSE - Abnormal; Notable for the following components:    POC Glucose  311 (*)     All other components within normal limits   RAINBOW DRAW BLUE   RAINBOW DRAW GOLD       ED Course as of 11/22/24 2125  ------------------------------------------------------------  Time: 11/22 1922  Comment: Urinalysis shows glucosuria and ketonuria without UTI  ------------------------------------------------------------  Time: 11/22 2042  Comment: CMP shows hyperglycemia without anion gap or  acidosis  ------------------------------------------------------------  Time: 11/22 2042  Comment: CBC with slight anemia, no leukocytosis              MDM              Medical Decision Making  Patient is well-appearing with stable vital signs, no acute distress.  Labs reviewed as above.  No evidence of DKA.  She was treated with 2 L of normal saline as well as 11 units NovoLog and blood glucose significantly improved.  She is feeling better.  Advise adherence to diabetes diet and close follow-up with her endocrinologist and she and her family members are comfortable with this plan.    Problems Addressed:  Type 2 diabetes mellitus with hyperglycemia, without long-term current use of insulin (HCC): complicated acute illness or injury with systemic symptoms    Amount and/or Complexity of Data Reviewed  Labs: ordered. Decision-making details documented in ED Course.        Disposition and Plan     Clinical Impression:  1. Type 2 diabetes mellitus with hyperglycemia, without long-term current use of insulin (HCC)         Disposition:  Discharge  11/22/2024  9:24 pm    Follow-up:  Liset Stark MD  [address] [place] [id number]  [phone]    Follow up  within 1 week    We recommend that you schedule follow up care with a primary care provider within the next three months to obtain basic health screening including reassessment of your blood pressure.      Medications Prescribed:  Current Discharge Medication List              Supplementary Documentation:

## 2024-11-24 NOTE — TELEPHONE ENCOUNTER
Received correspondence from ER  Seems like a referral for DM  Please call and offer consult with first available provider  Thanks

## 2024-11-26 NOTE — TELEPHONE ENCOUNTER
Patient has consult appointment with Dr Sanders on 1/30/25. Offered consult for Friday 11/29. Patient agreeable. Appointment scheduled. Provided address for clinic and phone number.   Kept 1/30 appointment in case patient has to cancel sooner appointment.

## 2024-11-29 NOTE — PROGRESS NOTES
New Patient Evaluation - History and Physical    CONSULT - Reason for Visit:  uncontrolled DM  Requesting Physician:   .Ana Paula Willis MD  No referring provider defined for this encounter.   CHIEF COMPLAINT:    Chief Complaint   Patient presents with    Diabetes     consult      HISTORY OF PRESENT ILLNESS:   Laurita Drake is a 74 year old female who presents with uncontrolled DM with hyperglycemia, neuropathy, HTN, DLP, and 1 kidney d/t renal carcinoma in her 30s.   I called her daughter over the phone to help with insulin doses. She is a nurse and helping with insulin doses    11/29/2024  A1c 12.2  from 7.39  She was started on humalog 75/25  on 11/22/2024  Went to hospital for high BG >500  DM HISTORY  Diagnosed: in ~1990   [facility] and [facility]     CURRENT DIABETIC MEDICATIONS INCLUDE:  MTF 1000 mg bid  humalog 75/25 bid based on sliding scale 6 units up to 22 units     HISTORY OF DIABETES COMPLICATIONS: :  History of Retinopathy:  no  History of Neuropathy:  yes  History of Nephropathy:  yes     ASSOCIATED COMPLICATIONS:   HTN:  yes  Hyperlipidemia: yes  CAD/ASCVD/PAD/CVA:  no    HOME GLUCOSE READINGS:   Random  this AM     . 14 day CGM data showed   GMI  %  TIR  %  high %  low %  very high %  Very low   %    Diet   Meals 2   Snacks sweets   Drinks   EXERCISE:     Lab Results   Component Value Date    A1C 12.2 (A) 11/29/2024    A1C 7.6 (H) 03/14/2023    A1C 10.2 (A) 10/24/2019        DM quality measures:  A1C/Blood pressure: as reported above   Last dilated eye exam: No data recorded  2024  Exam shows retinopathy? No data recorded  Last diabetic foot exam: No data recorded  Dentist : recommend every 6m  Nephropathy screening:   ace /arb rx:     LIPID screening: Cholesterol Meds: Lovastatin Tabs - 40 MG     No Lipid panel on file.   6/24/24  8:38 AM    Patient Fasting? Yes   Triglycerides  <=150 mg/dL 151 High    Direct HDL  >=40 mg/dL 49    .   Risk Factor  <5.0  3.2   Cholesterol  <=200 mg/dL 156   Calculated LDL  <=130 mg/dL 77   Calculated VLDL  <=30 mg/dL 30     Micro Albumen/Creatinine:  No results found for: \"MICROALBCREA\", \"MALBCREACALC\"       Latest Reference Range & Units 06/24/24 09:33   External Creatinine Ur Random 28.00 - 217.00 mg/dL 120.11 (E)   External Microalbumin, Ur 0.0 - 17.0 mg/dL 2.4 (E)   External Malb/Cre Calc Ratio <30 mg albumin/g creatinine  20.0 (E)       Last UTI ~ 1-1.5 yrs ago      ASSESSMENT AND PLAN:  Laurita Drake is a 74 year old female who presents with uncontrolled DM with hyperglycemia, neuropathy, HTN, DLP, and 1 kidney d/t renal carcinoma in her 30s.  Lost wt and not eating regular food. Snacking more now and consuming high carb food  per daughter.     Plan  Labs today   Will refer to diabetes educator,    Will start lantus 18 units/day   Will start glipizide 5 mg twice a day with meals. Ok to take 10 mg if eating larger meal  Will start trulicity 0.75 mg/wk-  Check with your insurance which med is covered ( Ozempic vs Trulicity vs Mounjaro)   Metformin 1000 mg twice a day   Will avoid  Jardiance ,  Farxiga and  Invokana)   Will give CGM/Continuous Glucose Monitors samples and send Rx. Might not be covered by your insurance.   follow up with diabetes educator in 2 weeks for CGM download    Check blood sugar fasting, before meals and before bedtime.   If you have low blood sugar <70, take 15 grams of carb (8 oz juice or regular soda) and recheck in 15 minutes.    Follow up with podiatry and eye doctor annually.   Patients need to wear covered shoes all the time and check feet daily.   Bring your meter/BG log to the next visit.      Target A1c 6.5%  Target BG   BEFORE MEAL 100-130mg/dl  2hrs AFTER MEAL less than 180mg/dl  GLP-1 agonists:  No personal or family history of MEN syndrome   No personal history pancreatitis   Patient counselled regarding side effects including injection site reactions, nausea, vomiting, diarrhea,  pancreatitis, gastroparesis and rare side effect jane Jose syndrome.        We discussed these in detail with the patient and negotiated which of numerous possible changes in the in the treatment plan that would be acceptable to them. The patient remains at ongoing is at high risk for complications related to uncontrolled diabetes and treatment.  The patient requires a great deal of self-management and support. We expect the patient's risk to be reduced with the changes to the treatment plan that we recommended today.   We reviewed a very large amount of complicated data including BG target range     PAST MEDICAL HISTORY:   Past Medical History:    Cancer (HCC)    renal carcinoma    Cataract    Diabetes (HCC)    Essential hypertension    Hearing impairment    High blood pressure    High cholesterol    Hyperlipidemia    MDS (myelodysplastic syndrome) (HCC)    Osteoarthritis    Renal disorder    Visual impairment    glaSSES       PAST SURGICAL HISTORY:   Past Surgical History:   Procedure Laterality Date    Colonoscopy N/A 4/2/2023    Procedure: COLONOSCOPY;  Surgeon: Bassam Bueno MD;  Location: [facility] ENDOSCOPY    Hip surgery Right 03/13/2023    right hip nailing    Hysterectomy      Nephrectomy         CURRENT MEDICATIONS:     Insulin Lispro Prot & Lispro (75-25) 100 UNIT/ML SC SUPN AS DIRECTED-per sliding scale      Dulaglutide (TRULICITY) 0.75 MG/0.5ML Subcutaneous Solution Auto-injector Inject 7.5 mg into the skin every 7 days. 6 mL 0    insulin glargine (LANTUS SOLOSTAR) 100 UNIT/ML Subcutaneous Solution Pen-injector Inject 18 Units into the skin nightly. 15 mL 2    glipiZIDE 5 MG Oral Tab Take 1 tablet (5 mg total) by mouth 2 (two) times daily before meals. 180 tablet 1    Continuous Glucose Sensor (DEXCOM G7 SENSOR) Does not apply Misc 1 each Every 10 days. Use as directed every 10 days 3 each 10    cyanocobalamin 1000 MCG Oral Tab Take 1 tablet (1,000 mcg total) by mouth daily.      Cholecalciferol  (VITAMIN D3) 25 MCG (1000 UT) Oral Cap Take 1 tablet by mouth daily.      valsartan 160 MG Oral Tab Take 1 tablet (160 mg total) by mouth daily.      metFORMIN HCl 1000 MG Oral Tab Take 1 tablet (1,000 mg total) by mouth 2 (two) times daily with meals. 180 tablet 1    Calcium Carbonate 600 MG Oral Tab Take 1 tablet BID      folic acid 1 MG Oral Tab Take 1 tablet (1 mg total) by mouth daily.      Lovastatin 40 MG Oral Tab Take 1 tablet (40 mg total) by mouth nightly.      NIFEdipine ER 60 MG Oral Tablet 24 Hr Take 30 mg by mouth every morning.      Propranolol HCl 20 MG Oral Tab Take 3 tablets (60 mg total) by mouth 2 (two) times daily.         ALLERGIES:  Allergies[1]    SOCIAL HISTORY:    Social History     Socioeconomic History    Marital status:    Tobacco Use    Smoking status: Former     Types: Cigarettes    Smokeless tobacco: Never   Vaping Use    Vaping status: Never Used   Substance and Sexual Activity    Alcohol use: Yes     Alcohol/week: 1.0 standard drink of alcohol     Types: 1 Glasses of wine per week    Drug use: Never     FAMILY HISTORY:   Family History   Problem Relation Age of Onset    Diabetes Mother     No Known Problems Daughter         REVIEW OF SYSTEMS:  All negative other than HPI    PHYSICAL EXAM:   Height: 5' 3.5\" (161.3 cm) (11/29 1306)  Weight: 159 lb (72.1 kg) (11/29 1306)  BSA (Calculated - sq m): 1.76 sq meters (11/29 1306)  Pulse: 71 (11/29 1306)  BP: 138/75 (11/29 1306)  Temp: --  Do Not Use - Resp Rate: --  SpO2: --    Body mass index is 27.72 kg/m².  Wt Readings from Last 6 Encounters:   11/29/24 159 lb (72.1 kg)   11/22/24 158 lb (71.7 kg)   04/02/23 182 lb (82.6 kg)   03/13/23 181 lb (82.1 kg)   11/21/19 190 lb (86.2 kg)   10/24/19 183 lb 3.2 oz (83.1 kg)       CONSTITUTIONAL:  Awake and alert. Age appropriate, good hygiene not in acute distress. Well-nourished and well developed. no acute distress   PSYCH:   Orientated to time, place, person & situation, Normal mood and  affect, memory intact, normal insight and judgment, cooperative  Neuro: speech is clear. Awake, alert, no aphasia, no facial asymmetry, no nuchal rigidity  EYES:  No proptosis, no ptosis, conjunctiva normal  ENT:  Normocephalic, atraumatic  Eye: EOMI, normal lids, no discharge, no conjunctival erythema. No exophthalmos/proptosis, Ptosis negative   No rhinorrhea, moist oral mucosa  Neck: full range of motion  Neck/Thyroid: neck inspection: normal, No scar, No goiter   LUNGS:  No acute respiratory distress, non-labored respiration. Speaking full sentences  CARDIOVASCULAR:  regular rate   ABDOMEN:  No abdominal pain.   SKIN:  no bruising or bleeding, no rashes and no lesions, Skin is dry, no obvious rashes or lesions  EXTREMITIES: no gross abnormality   MSK: Moves extremities spontaneously. full range of motion in all major joints      DATA:     Pertinent data reviewed     Lab Results   Component Value Date    A1C 12.2 (A) 11/29/2024    A1C 7.6 (H) 03/14/2023    A1C 10.2 (A) 10/24/2019      No Lipid results on file in last 5 years .    No results found.  Micro Albumen/Creatinine:  No results found for: \"MICROALBCREA\", \"MALBCREACALC\"   Diabetes:    Lab Results   Component Value Date    A1C 12.2 (A) 11/29/2024    A1C 7.6 (H) 03/14/2023    A1C 10.2 (A) 10/24/2019     (H) 03/14/2023   No results found for: \"CHOLEST\"No results found for: \"HDL\"No results found for: \"LDL\"No results found for: \"TRIG\", \"TRIGLY\"  Lab Results   Component Value Date    AST 11 11/22/2024     Lab Results   Component Value Date    ALT 11 11/22/2024         No results for input(s): \"TSH\", \"T4F\", \"T3F\", \"THYP\" in the last 72 hours.     POC HemoCue Glucose 201 (Finger stick glucose)        Component Value Flag Ref Range Units Status    GLUCOSE BLOOD 160        Final    Test Strip Lot # [id number]       Numeric Corrected    Test Strip Expiration Date 4/3/25       Date Corrected                  POC Glycohemoglobin [78326]        Component Value  Flag Ref Range Units Status    HEMOGLOBIN A1C 12.2      4.3 - 5.6 % Final    Cartridge Lot# [id number]       Numeric Final    Cartridge Expiration Date 26       Date Final                  Orders Placed This Encounter   Procedures    POC HemoCue Glucose 201 (Finger stick glucose)    POC Glycohemoglobin [11508]    Microalb/Creat Ratio, Random Urine    Lipid Panel    Hemoglobin A1C    Comp Metabolic Panel (14)    TSH W Reflex To Free T4     Orders Placed This Encounter    POC HemoCue Glucose 201 (Finger stick glucose)     Order Specific Question:   Release to patient     Answer:   Immediate    POC Glycohemoglobin [[id number]]     Order Specific Question:   Release to patient     Answer:   Immediate    Microalb/Creat Ratio, Random Urine     Order Specific Question:   Release to patient     Answer:   Immediate    Lipid Panel     Order Specific Question:   Release to patient     Answer:   Immediate    Hemoglobin A1C    Comp Metabolic Panel (14)    TSH W Reflex To Free T4     Order Specific Question:   Release to patient     Answer:   Immediate    Insulin Lispro Prot & Lispro (75-25) 100 UNIT/ML SC SUPN     Sig: AS DIRECTED-per sliding scale    DISCONTD: Continuous Glucose Sensor (DEXCOM G7 SENSOR) Does not apply Misc     Si each Every 10 days. Use as directed every 10 days     Dispense:  3 each     Refill:  10    Dulaglutide (TRULICITY) 0.75 MG/0.5ML Subcutaneous Solution Auto-injector     Sig: Inject 7.5 mg into the skin every 7 days.     Dispense:  6 mL     Refill:  0    insulin glargine (LANTUS SOLOSTAR) 100 UNIT/ML Subcutaneous Solution Pen-injector     Sig: Inject 18 Units into the skin nightly.     Dispense:  15 mL     Refill:  2    glipiZIDE 5 MG Oral Tab     Sig: Take 1 tablet (5 mg total) by mouth 2 (two) times daily before meals.     Dispense:  180 tablet     Refill:  1    Continuous Glucose Sensor (DEXCOM G7 SENSOR) Does not apply Misc     Si each Every 10 days. Use as directed every 10 days      Dispense:  3 each     Refill:  10      No results found for: \"TSH\"  @LASTLAB     Orders Placed This Encounter   Procedures    DIABETIC EDUCATION - INTERNAL     Referral Priority:   Routine     Referral Type:   Education     Number of Visits Requested:   1        This is a specialized patient consultation in endocrinology and required comprehensive review of prior records, as well as current evaluation, with time required for consideration of complex endocrine issues and consultation. For this visit, I personally interviewed the patient, and family member if accompanied, performed the pertinent parts of the history and physical examination. ROS included screening for appropriate endocrine conditions.   Today's diagnosis and plan were reviewed in detail with the patient who states understanding and agrees with plan. I discussed with the patient possible diagnosis, differential diagnosis, need for work up, treatment options, alternatives and side effects.     Please see note for details about time spent which includes:   · pre-visit preparation  · reviewing records  · face to face time with the patient   · timely documentation of the encounter  · ordering medications/tests  · communication with care team  · care coordination    I appreciate the opportunity to be part of your patient's medical care and will keep you, as the referring and primary physicians, informed about the care of your patient. Please feel free to contact me should you have any questions.      The 21st Century Cures Act makes medical notes like these available to patients in the interest of transparency. Please be advised this is a medical document. Medical documents are intended to carry relevant information, facts as evident, and the clinical opinion of the practitioner. The medical note is intended as peer to peer communication and may appear blunt or direct. It is written in medical language and may contain abbreviations or verbiage that are  unfamiliar.     Melissa Sanders MD          [1]   Allergies  Allergen Reactions    Ciprofloxacin HIVES    Glimepiride OTHER (SEE COMMENTS)     Severe hypoglycemia

## 2024-11-29 NOTE — PROGRESS NOTES
Laurita Martin Vidal  10/18/1950      Lab Results   Component Value Date    A1C 12.2 (A) 11/29/2024    A1C 7.6 (H) 03/14/2023    A1C 10.2 (A) 10/24/2019        Per provider request, patient provided with insulin injection training. Patient will be starting on the following insulin doses:  Lantus 18 units into the skin nightly        Topics Discussed:   Use of pen and needle   Sites to dose insulin   Before and after care of skin   Storage of insulin    Travel considerations   Signs and symptoms of hypoglycemia and treatment with rule of 15s.     Patient's questions answered.       Patient performed accurate teach back of information supplied by this RN and is ready to start insulin dosing at home.    Per MD request patient was educated how to inject Trulicity in the office.   used: no, patient's daughter present virtually via conference call.  This RN discussed with the patient the mechanism of action of the medication: it can limit how much sugar can get into your blood from your liver, it helps slow down how quickly food leaves your stomach, it helps your pancreas release insulin in response to high sugar levels.   This RN discussed common side effects with the patient: nausea, vomiting, diarrhea, abdominal pain, decreased appetite, indigestion, fatigue. Notify our office if these occur.   This RN discussed serious side effects with the patient: Severe unrelenting abdominal pain that radiates from your stomach to your back, dizziness, lightheadedness, confusion, severe headache, visual changes, swelling of the face, lips, or tongue, difficulty breathing, hives, swelling or redness at the injection site, yellowing of the skin or eyes, gray stools, red stools, or black stools. If any of these symptoms occur call your healthcare provider and/or go to the emergency room.   This RN discussed that this medication should be stored in the refrigerator when not in use. If traveling with the pen, the medication  can be kept at room temperature (below 86 degrees Fahrenheit) for up to 14 days.   This RN discussed the proper injection locations for this medication: subcutaneous injection into the fatty tissue of the upper outer arms, abdomen, or upper inner thigh. To rotate injection sites to prevent scar tissue from forming.   This RN demonstrated the correct administration technique in front of the patient: wash your hands, cleanse the injection site with an alcohol wipe, check expiration date, inspect the medication (make sure it is not cloudy or has particles in it), remove the gray cap, twist end of pen into green unlock position, hold administration end firmly against the skin of the injection location at a 90 degree angle, press and hold the green button until 2 clicks are heard (about 5-10 seconds), remove the pen, safely dispose of the pen in a sharps container or state approved container.   The patient demonstrated the technique back to this RN and was allowed to practice until he/she felt comfortable.   This RN answered their questions.   Patient advised to call the office if there are any issues with insurance coverage or cost of the medication.     Dexcom G7 sensor supplied to the patient and applied to right upper arm. Skin cleansed with alcohol, skin tac applied, sensor applied, over patch applied.   Sensor Lot: [id number]  Sensor expiration: 5/1/2024  Patient tolerated well. Patient understands sensor may stay in place for 10 days and then will need to be removed and a new sensor applied. Patient understands sensor may get wet, but do not submerge in water for more than 30 minutes. Cannot wear during an MRI. Discussed if patient is feeling symptoms of hyperglycemia or hypoglycemia and dexcom blood sugar reading is normal, to always check blood sugar with fingerstick glucometer. When in doubt, take your meter out. Showed patient how to calibrate dexcom with capillary sample reading.   Patient to return to clinic  in 1 month with MD. Patient also referred to diabetes learning center. Phone number provided to schedule.  Assisted patient with downloading dexcom G7 anayeli and dexcom clarity Anayeli.   Alarm low setting set at : 70  Alarm high setting set at: 250  Patient connected to clinic portal via share code. Patient understands sugars will not be reviewed by office without her request first.   Patient understands to notify office if prescription dexcom sensors are not covered by insurance.     Total of 20 minutes spent educating patient on this device, applying sensor, and assisting with anayeli download and set up as well as insulin teaching and trulicity teaching.

## 2024-11-29 NOTE — PATIENT INSTRUCTIONS
Labs today   Will refer to diabetes educator,    Will start lantus 18 units/day   Will start glipizide 5 mg twice a day with meals. Ok to take 10 mg if eating larger meal  Will start trulicity 0.75 mg/wk-  Check with your insurance which med is covered ( Ozempic vs Trulicity vs Mounjaro)   Metformin 1000 mg twice a day   Will avoid  Jardiance ,  Farxiga and  Invokana)   Will give CGM/Continuous Glucose Monitors samples and send Rx. Might not be covered by your insurance.   follow up with diabetes educator in 2 weeks for CGM download  Check blood sugar fasting, before meals and before bedtime.   If you have low blood sugar <70, take 15 grams of carb (8 oz juice or regular soda) and recheck in 15 minutes.    Follow up with podiatry and eye doctor annually.   Patients need to wear covered shoes all the time and check feet daily.   Bring your meter/BG log to the next visit.      Target A1c 6.5%  Target BG   BEFORE MEAL 100-130mg/dl  2hrs AFTER MEAL less than 180mg/dl    GLP-1 agonists:  No personal or family history of MEN syndrome   No personal history pancreatitis   Patient counselled regarding side effects including injection site reactions, nausea, vomiting, diarrhea, pancreatitis, gastroparesis and rare side effect jane Jose syndrome.

## 2024-12-01 NOTE — TELEPHONE ENCOUNTER
Daughter called  Fasting 220  Pre dinner ; 300s- 400s    Current regimen:  Lantus 18   Glipizide 5 mg bid  Trulicity 0.75 mg weekly --> has not taken yet    Plan:   Take trulicity  Increase lantus to 22 units daily  Keep glipizide 5 mg bid    Call with sugars tmrw    ENDO STAFF:  Please fu with patient tmrw  SURI García    Thanks

## 2024-12-02 NOTE — TELEPHONE ENCOUNTER
Dr. Sanders,     Called pt for update. Patient states that BG are doing better. Pts Dexcom stopped working yesterday night and was removed. Patient will call pharmacy to check status of sensors. Aware to call office with any issues.    Patient checked BG while on the phone,154 fasting     Patient taking:   Lantus 22 units   Glipizide 5 mg BID  MTF 1000 mg BID  Trulicity 0.75 mg, 12/1 Sunday

## 2025-01-03 NOTE — PATIENT INSTRUCTIONS
Resume  trulicity 0.75 mg/wk. Titrate monthly as tolerated   Metformin 1000 mg twice a day   Will refer to diabetes educator,    If you get low BG , decrease  from twice a day with meals to once a day with the largest meal of the day glipizide 5 mg twice a day with meals. Ok to skip if eating a smaller meal      If you have low blood sugar <70, take 15 grams of carb (8 oz juice or regular soda) and recheck in 15 minutes.    Follow up with podiatry and eye doctor annually.   Patients need to wear covered shoes all the time and check feet daily.   Bring your meter/BG log to the next visit.      Target A1c 6.5%  Target BG   BEFORE MEAL 100-130mg/dl  2hrs AFTER MEAL less than 180mg/dl  GLP-1 agonists:  No personal or family history of MEN syndrome   No personal history pancreatitis   Patient counselled regarding side effects including injection site reactions, nausea, vomiting, diarrhea, pancreatitis, gastroparesis and rare side effect jane Jose syndrome.

## 2025-01-03 NOTE — PROGRESS NOTES
-----------------------------  Dexcom Clarity  -----------------------------  Laurita Drake  YOB: 1950  Generated at: Fri, Guillermo 3, 2025 8:40 AM CST  Reporting period: u Dec 5, 2024 - Fri Guillermo 3, 2025  -----------------------------  Glucose Details  Average glucose: 161 mg/dL  Standard deviation: 48 mg/dL  GMI: 7.2%  -----------------------------  Time in Range  Very High: 6%  High: 23%  In Range: 71%  Low: 0%  Very Low: <1%  Target Range   mg/dL    -----------------------------  CGM Details  Sensor usage: 90%  Days with CGM data: 27/30

## 2025-01-16 NOTE — PROGRESS NOTES
Laurita Drake : 10/18/1950  attended individual initial assessment for Diabetes Education:    Date: 2025         Start time: 0750 End time: 0855    HEMOGLOBIN A1C (%)   Date Value   2024 12.2 (A)       Wt Readings from Last 1 Encounters:   25 164 lb       Assessment: Developed diabetes at the age of 30.  Past December went to urgent care due to elevated blood sugar values.      Diet Hx:   1 PM egg and sausage (1 cup coffee earlier) drinking water with meals  4-5 PM: greens, chicken, rice (1/2 cup)    Snacks: potato chips, fruits cookies, doughnuts, ice cream    Education provided on the below topics:     Diabetes Overview:  Pathophysiology, A1C results and treatment options for diabetes self-management reviewed.   Described basic process and treatment options for diabetes self-management.  Introduced long term impact of uncontrolled blood glucose on health.    Healthy Eating:  Obtained usual diet history.  Instructed on Basic Diet Guidelines which includes eating 3 meals/day. Eat moderate amounts at consistent times from day to day. Limit/avoid sweets. Instructed on Balanced Plate Method of meal planning. Instructed to limit grains or starchy vegetables to ¼ of plate, protein to ¼ of plate, non-starchy vegetables to ½ plate and modest portions of fruit, yogurt, milk as desired.    Being Active:   Encouraged Physical Activity and briefly reviewed ADA recommended guidelines for activity with type 2 diabetes.    Taking Medications:   Reviewed current diabetes medications (if applicable).  Trulicity  Metformin  Glipizide    Monitoring:  Instructed/reinforced blood glucose monitoring, testing schedules and target goals:   Fasting / Pre-meal  mg/dL 2 Hour Post-prandial <180 mg/dL  Instructed on Time in Range Values and the importance of 70% or greater.    Problem Solving:   Prevention, detection and treatment of acute complications: taught symptoms of hypoglycemia, hyperglycemia, how to  treat low blood sugar (Rule of 15) and actions for lowering high blood glucose levels.     Behavior Change:  Initiated individualized goal setting process and will continue to refine throughout class series.    Recommendations:      Monitor blood glucose as directed.  Follow Balance Plate method of meal planning.   Attend all Group Class in series -starting in February  Encouraged patient to contact insurance carrier to discuss out of pocket cost. CPT codes provided.    Prescriptions sent to preferred pharmacy for blood glucose meter, test strips and lancets per protocol.  Written materials provided for all areas covered.  Patient verbalized understanding and all questions answered.    Tia Lou RN

## 2025-01-30 NOTE — PROGRESS NOTES
-----------------------------  Dexcom Clarity  -----------------------------  Laurita Drake    YOB: 1950    Generated at: Thu, Jan 30, 2025 8:41 AM CST    Reporting period: Thu Dec 26, 2024 - Fri Jan 24, 2025  -----------------------------  Glucose Details    Average glucose: 153 mg/dL    GMI: 7.0%    Standard deviation: 47 mg/dL    Coefficient of Variation: 30.6%  -----------------------------  Time in Range    Very High: 5%    High: 18%    In Range: 77%    Low: 0%    Very Low: <1%    Target Range   mg/dL    -----------------------------  Sensor usage    Days with data: 29/30    Time active: 97%    Avg. calibrations per day: 0.0

## 2025-01-30 NOTE — PROGRESS NOTES
Reason for Visit:  uncontrolled DM  Requesting Physician:   .Ana Paula Willis MD  No referring provider defined for this encounter.   CHIEF COMPLAINT:    Chief Complaint   Patient presents with    Diabetes     F/u      HISTORY OF PRESENT ILLNESS:   Laurita Drake is a 74 year old female who presents with uncontrolled DM with hyperglycemia, neuropathy, HTN, DLP, and 1 kidney d/t renal carcinoma in her 30s.   2025   b a1c: 7.4  Likes the CGM     Very High: 5%    High: 18%    In Range: 77%    Low: 0%    Very Low: <1%     Target Range    Lost wt    DM HISTORY  Diagnosed: in ~   Had multiple UTI from jardiance and has 1 kidney   She was started on humalog 75/25  on 2024. Took insulin till ~  then stopped d/t low BG   CURRENT DIABETIC MEDICATIONS INCLUDE:  MTF 1000 mg bid  Glipizide 5 mg every day QAM   Trulicity 0.75    HISTORY OF DIABETES COMPLICATIONS: :  History of Retinopathy:  no  History of Neuropathy:  yes  History of Nephropathy:  yes     ASSOCIATED COMPLICATIONS:   HTN:  yes  Hyperlipidemia: yes  CAD/ASCVD/PAD/CVA:  no    HOME GLUCOSE READINGS:      Reviewed CGM data                 Lab Results   Component Value Date    A1C 12.2 (A) 2024    A1C 7.6 (H) 2023    A1C 10.2 (A) 10/24/2019        DM quality measures:  A1C/Blood pressure: as reported above   Last dilated eye exam: No data recorded  3/2024  Exam shows retinopathy? No data recorded  Last diabetic foot exam: No data recorded  Dentist : recommend every 6m  Nephropathy screening:   ace /arb rx:     LIPID screening: Cholesterol Meds: Lovastatin Tabs - 40 MG     No Lipid panel on file.   24 11:46 AM    Patient Fasting? No   Triglycerides  <=150 mg/dL 99   Direct HDL  >=40 mg/dL 51       Risk Factor  <5.0 2.8   Cholesterol  <=200 mg/dL 144   Calculated LDL  <=130 mg/dL 73   Calculated VLDL  <=30 mg/dL 24 11:46 AM    TSH  0.270 - 4.200 mIU/L 2.440     Micro Albumen/Creatinine:  No results found  for: \"MICROALBCREA\", \"MALBCREACALC\"        Ref Range & Units 12/2/24 12:55 PM   External Microalbumin, Ur  0.0 - 17.0 mg/dL 20.8 High    External Creatinine Ur Random  28.00 - 217.00 mg/dL 570.56 High    External Malb/Cre Calc Ratio  <30 mg albumin/g creatinine 36.5 High        Last UTI ~ 1-1.5 yrs ago      Wt Readings from Last 6 Encounters:   01/30/25 161 lb (73 kg)   01/16/25 161 lb 9.6 oz (73.3 kg)   01/03/25 164 lb (74.4 kg)   11/29/24 159 lb (72.1 kg)   11/22/24 158 lb (71.7 kg)   04/02/23 182 lb (82.6 kg)       ASSESSMENT AND PLAN:  Laurita Drake is a 74 year old female who presents with uncontrolled DM with hyperglycemia, neuropathy, HTN, DLP, and 1 kidney d/t renal carcinoma in her 30s.    Will avoid SGLT2i   On statin and ARBs      Plan  RTC in 3 mo    Labs today   Will refer to nephrology     You are doing great    Last A1c value was 12.2% done 11/29/2024.  1/30/2025 A1c 7.4    Will titrate up trulicity 0.75 mg/wk -> 1.5 then 3 mg/wk  Titrate monthly as tolerated    Metformin 1000 mg twice a day   Glipizide 5 mg with meals in the morning but will stop when you start getting low BG     We need annual eye exam and podiatry exam     If you get low BG , decrease  from twice a day with meals to once a day with the largest meal of the day glipizide 5 mg twice a day with meals. Ok to skip if eating a smaller meal      If you have low blood sugar <70, take 15 grams of carb (8 oz juice or regular soda) and recheck in 15 minutes.    Follow up with podiatry and eye doctor annually.   Patients need to wear covered shoes all the time and check feet daily.   Bring your meter/BG log to the next visit.      Target A1c 6.5-7%  Target BG   BEFORE MEAL 100-130mg/dl  2hrs AFTER MEAL less than 180mg/dl  GLP-1 agonists:  No personal or family history of MEN syndrome   No personal history pancreatitis   Patient counselled regarding side effects including injection site reactions, nausea, vomiting, diarrhea, pancreatitis,  gastroparesis and rare side effect jane Jose syndrome.   We discussed these in detail with the patient and negotiated which of numerous possible changes in the in the treatment plan that would be acceptable to them. The patient remains at ongoing is at high risk for complications related to uncontrolled diabetes and treatment.  The patient requires a great deal of self-management and support. We expect the patient's risk to be reduced with the changes to the treatment plan that we recommended today.   We reviewed a very large amount of complicated data including BG target range     HYPERTENSION  https://www.acpjournals.org/pb-assets/pdf/patient-info/[email or web address]    If blood pressure is high, monitor blood pressure at home and follow up with primary care.   Some people's blood pressure readings differ between the doctor's office and at home.     Am I at Risk?  There is no single identifiable cause of hypertension. Many factors can contribute, including:   Being overweight or obese   Eating a diet high in sodium (salt)   Not getting enough physical activity   Being older or    Smoking   Drinking too much alcohol   Having a personal or family history of hypertension   Having other chronic diseases, especially diabetes or kidney disease    PAST MEDICAL HISTORY:   Past Medical History:    Cancer (HCC)    renal carcinoma    Cataract    Diabetes (HCC)    Essential hypertension    Hearing impairment    High blood pressure    High cholesterol    Hyperlipidemia    MDS (myelodysplastic syndrome) (HCC)    Osteoarthritis    Renal disorder    Visual impairment    glaSSES       PAST SURGICAL HISTORY:   Past Surgical History:   Procedure Laterality Date    Colonoscopy N/A 4/2/2023    Procedure: COLONOSCOPY;  Surgeon: Bassam Bueno MD;  Location: [facility] ENDOSCOPY    Hip surgery Right 03/13/2023    right hip nailing    Hysterectomy      Nephrectomy         CURRENT  MEDICATIONS:     metFORMIN HCl 1000 MG Oral Tab Take 1 tablet (1,000 mg total) by mouth 2 (two) times daily with meals. 180 tablet 1    [START ON 1/31/2025] Dulaglutide (TRULICITY) 1.5 MG/0.5ML Subcutaneous Solution Auto-injector Inject 1.5 mg into the skin every 7 days. 2 mL 0    [START ON 2/28/2025] Dulaglutide (TRULICITY) 3 MG/0.5ML Subcutaneous Solution Auto-injector Inject 3 mg into the skin every 7 days. 2 mL 0    Continuous Glucose Sensor (DEXCOM G7 SENSOR) Does not apply Misc 1 each Every 10 days. Use as directed every 10 days 3 each 10    glipiZIDE 5 MG Oral Tab Take 1 tablet (5 mg total) by mouth 2 (two) times daily before meals. 180 tablet 1    Cholecalciferol (VITAMIN D3) 25 MCG (1000 UT) Oral Cap Take 1 tablet by mouth daily.      valsartan 160 MG Oral Tab Take 1 tablet (160 mg total) by mouth daily.      Calcium Carbonate 600 MG Oral Tab Take 1 tablet BID      Lovastatin 40 MG Oral Tab Take 1 tablet (40 mg total) by mouth nightly.      NIFEdipine ER 60 MG Oral Tablet 24 Hr Take 30 mg by mouth every morning.      Propranolol HCl 20 MG Oral Tab Take 3 tablets (60 mg total) by mouth 2 (two) times daily.         ALLERGIES:  Allergies[1]    SOCIAL HISTORY:    Social History     Socioeconomic History    Marital status:    Tobacco Use    Smoking status: Former     Types: Cigarettes    Smokeless tobacco: Never   Vaping Use    Vaping status: Never Used   Substance and Sexual Activity    Alcohol use: Yes     Alcohol/week: 1.0 standard drink of alcohol     Types: 1 Glasses of wine per week    Drug use: Never     FAMILY HISTORY:   Family History   Problem Relation Age of Onset    Diabetes Mother     No Known Problems Daughter          PHYSICAL EXAM:   Height: 5' 4\" (162.6 cm) (01/30 0835)  Weight: 161 lb (73 kg) (01/30 0835)  BSA (Calculated - sq m): 1.78 sq meters (01/30 0835)  Pulse: 73 (01/30 0835)  BP: 138/62 (01/30 0835)  Temp: --  Do Not Use - Resp Rate: --  SpO2: --    Body mass index is 27.64  kg/m².  Wt Readings from Last 6 Encounters:   01/30/25 161 lb (73 kg)   01/16/25 161 lb 9.6 oz (73.3 kg)   01/03/25 164 lb (74.4 kg)   11/29/24 159 lb (72.1 kg)   11/22/24 158 lb (71.7 kg)   04/02/23 182 lb (82.6 kg)          DATA:     Pertinent data reviewed     Lab Results   Component Value Date    A1C 12.2 (A) 11/29/2024    A1C 7.6 (H) 03/14/2023    A1C 10.2 (A) 10/24/2019      No Lipid results on file in last 5 years .    No results found.  Micro Albumen/Creatinine:  No results found for: \"MICROALBCREA\", \"MALBCREACALC\"   Diabetes:    Lab Results   Component Value Date    A1C 12.2 (A) 11/29/2024    A1C 7.6 (H) 03/14/2023    A1C 10.2 (A) 10/24/2019     (H) 03/14/2023   No results found for: \"CHOLEST\"No results found for: \"HDL\"No results found for: \"LDL\"No results found for: \"TRIG\", \"TRIGLY\"  Lab Results   Component Value Date    AST 11 11/22/2024     Lab Results   Component Value Date    ALT 11 11/22/2024         No results for input(s): \"TSH\", \"T4F\", \"T3F\", \"THYP\" in the last 72 hours.         Orders Placed This Encounter   Procedures    POC HemoCue Glucose 201 (Finger stick glucose)    POC Glycohemoglobin [06344]    Microalb/Creat Ratio, Random Urine     Orders Placed This Encounter    POC HemoCue Glucose 201 (Finger stick glucose)     Order Specific Question:   Release to patient     Answer:   Immediate    POC Glycohemoglobin [45136]     Order Specific Question:   Release to patient     Answer:   Immediate    Microalb/Creat Ratio, Random Urine     Order Specific Question:   Release to patient     Answer:   Immediate    metFORMIN HCl 1000 MG Oral Tab     Sig: Take 1 tablet (1,000 mg total) by mouth 2 (two) times daily with meals.     Dispense:  180 tablet     Refill:  1    Dulaglutide (TRULICITY) 1.5 MG/0.5ML Subcutaneous Solution Auto-injector     Sig: Inject 1.5 mg into the skin every 7 days.     Dispense:  2 mL     Refill:  0    Dulaglutide (TRULICITY) 3 MG/0.5ML Subcutaneous Solution Auto-injector      Sig: Inject 3 mg into the skin every 7 days.     Dispense:  2 mL     Refill:  0    Continuous Glucose Sensor (DEXCOM G7 SENSOR) Does not apply Misc     Si each Every 10 days. Use as directed every 10 days     Dispense:  3 each     Refill:  10      No results found for: \"TSH\"  @LASTLAB     Orders Placed This Encounter   Procedures    Nephrology Referral - In Network     Referral Priority:   Routine     Referral Type:   OFFICE VISIT     Requested Specialty:   NEPHROLOGY     Number of Visits Requested:   1        This is a specialized patient consultation in endocrinology and required comprehensive review of prior records, as well as current evaluation, with time required for consideration of complex endocrine issues and consultation. For this visit, I personally interviewed the patient, and family member if accompanied, performed the pertinent parts of the history and physical examination. ROS included screening for appropriate endocrine conditions.   Today's diagnosis and plan were reviewed in detail with the patient who states understanding and agrees with plan. I discussed with the patient possible diagnosis, differential diagnosis, need for work up, treatment options, alternatives and side effects.     Please see note for details about time spent which includes:   · pre-visit preparation  · reviewing records  · face to face time with the patient   · timely documentation of the encounter  · ordering medications/tests  · communication with care team  · care coordination    I appreciate the opportunity to be part of your patient's medical care and will keep you, as the referring and primary physicians, informed about the care of your patient. Please feel free to contact me should you have any questions.      The  [employer] Cures Act makes medical notes like these available to patients in the interest of transparency. Please be advised this is a medical document. Medical documents are intended to carry relevant  information, facts as evident, and the clinical opinion of the practitioner. The medical note is intended as peer to peer communication and may appear blunt or direct. It is written in medical language and may contain abbreviations or verbiage that are unfamiliar.     Melissa Sanders MD          [1]   Allergies  Allergen Reactions    Ciprofloxacin HIVES    Glimepiride OTHER (SEE COMMENTS)     Severe hypoglycemia

## 2025-01-30 NOTE — PATIENT INSTRUCTIONS
RTC in 3 mo    You are doing great    Last A1c value was 12.2% done 11/29/2024.  1/30/2025 A1c 7.4    Will titrate up trulicity 0.75 mg/wk -> 1.5 then 3 mg/wk  Titrate monthly as tolerated    Metformin 1000 mg twice a day   Glipizide 5 mg with meals in the morning but will stop when you start getting low BG   Will refer to nephrology   We need annual eye exam and podiatry exam     If you get low BG , decrease  from twice a day with meals to once a day with the largest meal of the day glipizide 5 mg twice a day with meals. Ok to skip if eating a smaller meal      If you have low blood sugar <70, take 15 grams of carb (8 oz juice or regular soda) and recheck in 15 minutes.    Follow up with podiatry and eye doctor annually.   Patients need to wear covered shoes all the time and check feet daily.   Bring your meter/BG log to the next visit.      Target A1c 6.5-7%  Target BG   BEFORE MEAL 100-130mg/dl  2hrs AFTER MEAL less than 180mg/dl      HYPERTENSION  https://www.acpjournals.org/pb-assets/pdf/patient-info/mds-[email or web address]-[email or web address].pdf    If blood pressure is high, monitor blood pressure at home and follow up with primary care.   Some people's blood pressure readings differ between the doctor's office and at home.     Am I at Risk?  There is no single identifiable cause of hypertension. Many factors can contribute, including:   Being overweight or obese   Eating a diet high in sodium (salt)   Not getting enough physical activity   Being older or    Smoking   Drinking too much alcohol   Having a personal or family history of hypertension   Having other chronic diseases, especially diabetes or kidney disease

## 2025-02-04 NOTE — TELEPHONE ENCOUNTER
Patient cancelled Step 2 DSMT class on 2/6. I called patient to let her know if that one is canceled I do have to cancel the rest of the classes. Patient said that was okay, I offered rescheduling, she was hesitant so I offered for her to call back when she is ready and patient said that worked better because she has some things going on currently.

## 2025-03-10 ENCOUNTER — APPOINTMENT (OUTPATIENT)
Dept: LAB | Age: 75
End: 2025-03-10

## 2025-03-10 ENCOUNTER — OFFICE VISIT (OUTPATIENT)
Dept: HEMATOLOGY/ONCOLOGY | Age: 75
End: 2025-03-10
Attending: INTERNAL MEDICINE

## 2025-03-10 VITALS
SYSTOLIC BLOOD PRESSURE: 136 MMHG | HEART RATE: 78 BPM | RESPIRATION RATE: 14 BRPM | OXYGEN SATURATION: 96 % | BODY MASS INDEX: 28.2 KG/M2 | WEIGHT: 163.14 LBS | DIASTOLIC BLOOD PRESSURE: 69 MMHG | TEMPERATURE: 96.8 F

## 2025-03-10 DIAGNOSIS — D50.0 IRON DEFICIENCY ANEMIA DUE TO CHRONIC BLOOD LOSS: Primary | ICD-10-CM

## 2025-03-10 DIAGNOSIS — D46.9 MDS (MYELODYSPLASTIC SYNDROME)  (CMD): ICD-10-CM

## 2025-03-10 LAB
ALBUMIN SERPL-MCNC: 3.8 G/DL (ref 3.4–5)
ALBUMIN/GLOB SERPL: 1 {RATIO} (ref 1–2.4)
ALP SERPL-CCNC: 76 UNITS/L (ref 45–117)
ALT SERPL-CCNC: 30 UNITS/L
ANION GAP SERPL CALC-SCNC: 13 MMOL/L (ref 7–19)
AST SERPL-CCNC: 30 UNITS/L
BASOPHILS # BLD: 0.1 K/MCL (ref 0–0.3)
BASOPHILS NFR BLD: 1 %
BILIRUB SERPL-MCNC: 0.4 MG/DL (ref 0.2–1)
BUN SERPL-MCNC: 12 MG/DL (ref 6–20)
BUN/CREAT SERPL: 20 (ref 7–25)
CALCIUM SERPL-MCNC: 9.9 MG/DL (ref 8.4–10.2)
CHLORIDE SERPL-SCNC: 106 MMOL/L (ref 97–110)
CO2 SERPL-SCNC: 29 MMOL/L (ref 21–32)
CREAT SERPL-MCNC: 0.61 MG/DL (ref 0.51–0.95)
DEPRECATED RDW RBC: 83.1 FL (ref 39–50)
EGFRCR SERPLBLD CKD-EPI 2021: >90 ML/MIN/{1.73_M2}
EOSINOPHIL # BLD: 0.2 K/MCL (ref 0–0.5)
EOSINOPHIL NFR BLD: 3 %
ERYTHROCYTE [DISTWIDTH] IN BLOOD: 21.5 % (ref 11–15)
FASTING DURATION TIME PATIENT: ABNORMAL H
GLOBULIN SER-MCNC: 3.8 G/DL (ref 2–4)
GLUCOSE SERPL-MCNC: 158 MG/DL (ref 70–99)
HCT VFR BLD CALC: 29.9 % (ref 36–46.5)
HGB BLD-MCNC: 9.1 G/DL (ref 12–15.5)
LDH SERPL L TO P-CCNC: 293 UNITS/L (ref 82–240)
LG PLATELETS BLD QL SMEAR: PRESENT
LYMPHOCYTES # BLD: 2 K/MCL (ref 1–4)
LYMPHOCYTES NFR BLD: 24 %
MCH RBC QN AUTO: 33.3 PG (ref 26–34)
MCHC RBC AUTO-ENTMCNC: 30.4 G/DL (ref 32–36.5)
MCV RBC AUTO: 109.5 FL (ref 78–100)
MONOCYTES # BLD: 0.7 K/MCL (ref 0.3–0.9)
MONOCYTES NFR BLD: 9 %
MYELOCYTES # BLD MANUAL: 1 %
NEUTROPHILS # BLD: 4.9 K/MCL (ref 1.8–7.7)
NEUTS BAND NFR BLD: 1 % (ref 0–10)
NEUTS SEG NFR BLD: 60 %
NRBC BLD MANUAL-RTO: 2 /100 WBC
OVALOCYTES BLD QL SMEAR: ABNORMAL
PLATELET # BLD AUTO: 462 K/MCL (ref 140–450)
POLYCHROMASIA BLD QL SMEAR: ABNORMAL
POTASSIUM SERPL-SCNC: 4.3 MMOL/L (ref 3.4–5.1)
PROT SERPL-MCNC: 7.6 G/DL (ref 6.4–8.2)
RBC # BLD: 2.73 MIL/MCL (ref 4–5.2)
SCHISTOCYTES BLD QL SMEAR: ABNORMAL
SODIUM SERPL-SCNC: 144 MMOL/L (ref 135–145)
VARIANT LYMPHS NFR BLD: 1 % (ref 0–5)
WBC # BLD: 8 K/MCL (ref 4.2–11)
WBC MORPH BLD: NORMAL

## 2025-03-10 PROCEDURE — 85027 COMPLETE CBC AUTOMATED: CPT | Performed by: INTERNAL MEDICINE

## 2025-03-10 PROCEDURE — 83615 LACTATE (LD) (LDH) ENZYME: CPT | Performed by: INTERNAL MEDICINE

## 2025-03-10 PROCEDURE — 3078F DIAST BP <80 MM HG: CPT | Performed by: INTERNAL MEDICINE

## 2025-03-10 PROCEDURE — 80053 COMPREHEN METABOLIC PANEL: CPT | Performed by: INTERNAL MEDICINE

## 2025-03-10 PROCEDURE — 99211 OFF/OP EST MAY X REQ PHY/QHP: CPT

## 2025-03-10 PROCEDURE — 99214 OFFICE O/P EST MOD 30 MIN: CPT | Performed by: INTERNAL MEDICINE

## 2025-03-10 PROCEDURE — 36415 COLL VENOUS BLD VENIPUNCTURE: CPT | Performed by: INTERNAL MEDICINE

## 2025-03-10 PROCEDURE — 3075F SYST BP GE 130 - 139MM HG: CPT | Performed by: INTERNAL MEDICINE

## 2025-03-10 ASSESSMENT — PATIENT HEALTH QUESTIONNAIRE - PHQ9
SUM OF ALL RESPONSES TO PHQ9 QUESTIONS 1 AND 2: 2
2. FEELING DOWN, DEPRESSED OR HOPELESS: SEVERAL DAYS
1. LITTLE INTEREST OR PLEASURE IN DOING THINGS: SEVERAL DAYS
CLINICAL INTERPRETATION OF PHQ2 SCORE: NO FURTHER SCREENING NEEDED
SUM OF ALL RESPONSES TO PHQ9 QUESTIONS 1 AND 2: 2

## 2025-03-10 ASSESSMENT — PAIN SCALES - GENERAL: PAINLEVEL: 0

## 2025-03-10 NOTE — TELEPHONE ENCOUNTER
Endocrine Refill protocol for oral and injectable diabetic medications    Protocol Criteria:  PASSED  Reason: N/A    If all below requirements are met, send a 90-day supply with 1 refill per provider protocol.    Verify appointment with Endocrinology completed in the last 6 months or scheduled in the next 3 months.  Verify A1C has been completed within the last 6 months and is below 8.5%     Last completed office visit: 1/30/2025 Melissa Sanders MD   Next scheduled Follow up:   Future Appointments   Date Time Provider Department Center   3/17/2025 11:45 AM Melissa Sanders MD [facility]      Last A1c result: Last A1c value was 7.4% done 1/30/2025.

## 2025-03-17 NOTE — PROGRESS NOTES
Reason for Visit:  uncontrolled DM  Requesting Physician:   .Ana Paula Willis MD  No referring provider defined for this encounter.   CHIEF COMPLAINT:    Chief Complaint   Patient presents with    Diabetes     F/u      HISTORY OF PRESENT ILLNESS:   Laurita Drake is a 74 year old female who presents with uncontrolled DM with hyperglycemia, neuropathy, HTN, DLP, and 1 kidney d/t renal carcinoma in her 30s.    Last A1c value was 7.4% done 1/30/2025.  She has general weakness   Likes the CGM       DM HISTORY  Diagnosed: in ~1990   Had multiple UTI from jardiance and has 1 kidney   She was started on humalog 75/25  on 11/22/2024. Took insulin till ~ 12/20th then stopped d/t low BG   CURRENT DIABETIC MEDICATIONS INCLUDE:  MTF 1000 mg bid  Glipizide 5 mg every day QAM     Stopped Trulicity       HISTORY OF DIABETES COMPLICATIONS: :  History of Retinopathy:  no  History of Neuropathy:  yes  History of Nephropathy:  yes     ASSOCIATED COMPLICATIONS:   HTN:  yes  Hyperlipidemia: yes  CAD/ASCVD/PAD/CVA:  no    HOME GLUCOSE READINGS:      Reviewed CGM data           Very High: 10%    High: 34%    In Range: 56%    Low: 0%    Very Low: <1%          Lab Results   Component Value Date    A1C 7.4 (A) 01/30/2025    A1C 12.2 (A) 11/29/2024    A1C 7.6 (H) 03/14/2023    A1C 10.2 (A) 10/24/2019        DM quality measures:  A1C/Blood pressure: as reported above   Last dilated eye exam: No data recorded  3/2024  Exam shows retinopathy? No data recorded  Last diabetic foot exam: No data recorded  Dentist : recommend every 6m  Nephropathy screening:   ace /arb rx:     LIPID screening: Cholesterol Meds: Lovastatin Tabs - 40 MG     No Lipid panel on file.   12/2/24 11:46 AM    Patient Fasting? No   Triglycerides  <=150 mg/dL 99   Direct HDL  >=40 mg/dL 51       Risk Factor  <5.0 2.8   Cholesterol  <=200 mg/dL 144   Calculated LDL  <=130 mg/dL 73   Calculated VLDL  <=30 mg/dL 20 12/2/24 11:46 AM    TSH  0.270 - 4.200 mIU/L 2.440      Micro Albumen/Creatinine:  No results found for: \"MICROALBCREA\", \"MALBCREACALC\"           Last UTI ~ 1-1.5 yrs ago      Wt Readings from Last 6 Encounters:   03/17/25 162 lb (73.5 kg)   01/30/25 161 lb (73 kg)   01/16/25 161 lb 9.6 oz (73.3 kg)   01/03/25 164 lb (74.4 kg)   11/29/24 159 lb (72.1 kg)   11/22/24 158 lb (71.7 kg)        03/03/25 10:59   External Creatinine Ur Random  159.36 (E)   External Microalbumin, Ur  2.1 (E)   External Malb/Cre Calc Ratio  13.2 (E)      ASSESSMENT AND PLAN:  Laurita Drake is a 74 year old female who presents with uncontrolled DM with hyperglycemia, neuropathy, HTN, DLP, and 1 kidney d/t renal carcinoma in her 30s.    Will avoid SGLT2i   On statin and ARBs   CGM showed postprandial hyperglycemia after breakfast. She stopped trulicity but does not know why. Will resume. Increase SANDOVAL now but decrease when back on GLP1a      Plan   See pcp for weakness.      Last A1c value was 7.4% done 1/30/2025.     RTC in 3 mo    Labs before next visit       You are doing great. Labs showed improvement in protein in urine between Dec 2024 and March 2025.  Restart  trulicity 0.75 mg/wk -> 1.5 then 3 mg/wk  Titrate monthly as tolerated    Metformin 1000 mg twice a day   Glipizide 5-> 10 mg with meals in the morning but will stop when you start getting low BG    We need annual eye exam and podiatry exam       If you have low blood sugar <70, take 15 grams of carb (8 oz juice or regular soda) and recheck in 15 minutes.    Follow up with podiatry and eye doctor annually.   Patients need to wear covered shoes all the time and check feet daily.   Bring your meter/BG log to the next visit.      Target A1c 6.5-7%  Target BG   BEFORE MEAL 100-130mg/dl  2hrs AFTER MEAL less than 180mg/dl  GLP-1 agonists:  No personal or family history of MEN syndrome   No personal history pancreatitis   Patient counselled regarding side effects including injection site reactions, nausea, vomiting, diarrhea,  pancreatitis, gastroparesis and rare side effect jane Jose syndrome.   We discussed these in detail with the patient and negotiated which of numerous possible changes in the in the treatment plan that would be acceptable to them. The patient remains at ongoing is at high risk for complications related to uncontrolled diabetes and treatment.  The patient requires a great deal of self-management and support. We expect the patient's risk to be reduced with the changes to the treatment plan that we recommended today.   We reviewed a very large amount of complicated data including BG target range     HYPERTENSION  https://www.acpjournals.org/pb-assets/pdf/patient-info/fep-[email or web address]-wtdhqcs-[email or web address].pdf    If blood pressure is high, monitor blood pressure at home and follow up with primary care.   Some people's blood pressure readings differ between the doctor's office and at home.     Am I at Risk?  There is no single identifiable cause of hypertension. Many factors can contribute, including:   Being overweight or obese   Eating a diet high in sodium (salt)   Not getting enough physical activity   Being older or    Smoking   Drinking too much alcohol   Having a personal or family history of hypertension   Having other chronic diseases, especially diabetes or kidney disease    PAST MEDICAL HISTORY:   Past Medical History:    Cancer (HCC)    renal carcinoma    Cataract    Diabetes (HCC)    Essential hypertension    Hearing impairment    High blood pressure    High cholesterol    Hyperlipidemia    MDS (myelodysplastic syndrome) (HCC)    Osteoarthritis    Renal disorder    Visual impairment    glaSSES       PAST SURGICAL HISTORY:   Past Surgical History:   Procedure Laterality Date    Colonoscopy N/A 4/2/2023    Procedure: COLONOSCOPY;  Surgeon: Bassam Bueno MD;  Location: [facility] ENDOSCOPY    Hip surgery Right 03/13/2023    right hip nailing    Hysterectomy      Nephrectomy          CURRENT MEDICATIONS:     metFORMIN HCl 1000 MG Oral Tab Take 1 tablet (1,000 mg total) by mouth 2 (two) times daily with meals. 180 tablet 1    glipiZIDE 5 MG Oral Tab Take 2 tablets (10 mg total) by mouth daily with breakfast. 180 tablet 0    [START ON 4/16/2025] Dulaglutide (TRULICITY) 1.5 MG/0.5ML Subcutaneous Solution Auto-injector Inject 1.5 mg into the skin every 7 days. 2 mL 0    Dulaglutide (TRULICITY) 0.75 MG/0.5ML Subcutaneous Solution Auto-injector Inject 0.75 mg into the skin every 7 days. 2 mL 0    [START ON 5/12/2025] Dulaglutide (TRULICITY) 3 MG/0.5ML Subcutaneous Solution Auto-injector Inject 3 mg into the skin every 7 days. 2 mL 2       ALLERGIES:  Allergies[1]    SOCIAL HISTORY:    Social History     Socioeconomic History    Marital status:    Tobacco Use    Smoking status: Former     Types: Cigarettes    Smokeless tobacco: Never   Vaping Use    Vaping status: Never Used   Substance and Sexual Activity    Alcohol use: Yes     Alcohol/week: 1.0 standard drink of alcohol     Types: 1 Glasses of wine per week    Drug use: Never     FAMILY HISTORY:   Family History   Problem Relation Age of Onset    Diabetes Mother     No Known Problems Daughter          PHYSICAL EXAM:   Height: 5' 4\" (162.6 cm) (03/17 1153)  Weight: 162 lb (73.5 kg) (03/17 1153)  BSA (Calculated - sq m): 1.79 sq meters (03/17 1153)  Pulse: 94 (03/17 1153)  BP: 143/77 (03/17 1153)  Temp: --  Do Not Use - Resp Rate: --  SpO2: --    Body mass index is 27.81 kg/m².  Wt Readings from Last 6 Encounters:   03/17/25 162 lb (73.5 kg)   01/30/25 161 lb (73 kg)   01/16/25 161 lb 9.6 oz (73.3 kg)   01/03/25 164 lb (74.4 kg)   11/29/24 159 lb (72.1 kg)   11/22/24 158 lb (71.7 kg)          DATA:     Pertinent data reviewed     Lab Results   Component Value Date    A1C 7.4 (A) 01/30/2025    A1C 12.2 (A) 11/29/2024    A1C 7.6 (H) 03/14/2023    A1C 10.2 (A) 10/24/2019      No Lipid results on file in last 5 years .    No results  found.  Micro Albumen/Creatinine:  No results found for: \"MICROALBCREA\", \"MALBCREACALC\"   Diabetes:    Lab Results   Component Value Date    A1C 7.4 (A) 01/30/2025    A1C 12.2 (A) 11/29/2024    A1C 7.6 (H) 03/14/2023     (H) 03/14/2023   No results found for: \"CHOLEST\"No results found for: \"HDL\"No results found for: \"LDL\"No results found for: \"TRIG\", \"TRIGLY\"  Lab Results   Component Value Date    AST 11 11/22/2024     Lab Results   Component Value Date    ALT 11 11/22/2024         No results for input(s): \"TSH\", \"T4F\", \"T3F\", \"THYP\" in the last 72 hours.         Orders Placed This Encounter   Procedures    Vitamin B12    TSH W Reflex To Free T4    Hemoglobin A1C     Orders Placed This Encounter    Vitamin B12    TSH W Reflex To Free T4     Order Specific Question:   Release to patient     Answer:   Immediate    Hemoglobin A1C     Order Specific Question:   Release to patient     Answer:   Immediate    metFORMIN HCl 1000 MG Oral Tab     Sig: Take 1 tablet (1,000 mg total) by mouth 2 (two) times daily with meals.     Dispense:  180 tablet     Refill:  1    glipiZIDE 5 MG Oral Tab     Sig: Take 2 tablets (10 mg total) by mouth daily with breakfast.     Dispense:  180 tablet     Refill:  0    Dulaglutide (TRULICITY) 1.5 MG/0.5ML Subcutaneous Solution Auto-injector     Sig: Inject 1.5 mg into the skin every 7 days.     Dispense:  2 mL     Refill:  0    Dulaglutide (TRULICITY) 0.75 MG/0.5ML Subcutaneous Solution Auto-injector     Sig: Inject 0.75 mg into the skin every 7 days.     Dispense:  2 mL     Refill:  0    Dulaglutide (TRULICITY) 3 MG/0.5ML Subcutaneous Solution Auto-injector     Sig: Inject 3 mg into the skin every 7 days.     Dispense:  2 mL     Refill:  2      No results found for: \"TSH\"  @LASTLAB     No orders of the defined types were placed in this encounter.       This is a specialized patient consultation in endocrinology and required comprehensive review of prior records, as well as current  evaluation, with time required for consideration of complex endocrine issues and consultation. For this visit, I personally interviewed the patient, and family member if accompanied, performed the pertinent parts of the history and physical examination. ROS included screening for appropriate endocrine conditions.   Today's diagnosis and plan were reviewed in detail with the patient who states understanding and agrees with plan. I discussed with the patient possible diagnosis, differential diagnosis, need for work up, treatment options, alternatives and side effects.     Please see note for details about time spent which includes:   · pre-visit preparation  · reviewing records  · face to face time with the patient   · timely documentation of the encounter  · ordering medications/tests  · communication with care team  · care coordination    I appreciate the opportunity to be part of your patient's medical care and will keep you, as the referring and primary physicians, informed about the care of your patient. Please feel free to contact me should you have any questions.      The 21st Century Cures Act makes medical notes like these available to patients in the interest of transparency. Please be advised this is a medical document. Medical documents are intended to carry relevant information, facts as evident, and the clinical opinion of the practitioner. The medical note is intended as peer to peer communication and may appear blunt or direct. It is written in medical language and may contain abbreviations or verbiage that are unfamiliar.     Melissa Sanders MD          [1]   Allergies  Allergen Reactions    Ciprofloxacin HIVES    Glimepiride OTHER (SEE COMMENTS)     Severe hypoglycemia

## 2025-03-17 NOTE — PATIENT INSTRUCTIONS
RTC in 3 mo    Labs before next visit       You are doing great. Labs showed improvement in protein in urine between Dec 2024 and March 2025.     12/02/24 12:55 03/03/25 10:59   External Creatinine Ur Random 28.00 - 217.00 mg/dL 570.56 (H) (E) 159.36 (E)   External Microalbumin, Ur 0.0 - 17.0 mg/dL 20.8 (H) (E) 2.1 (E)   External Malb/Cre Calc Ratio <30 mg albumin/g creatinine  36.5 (H) (E) 13.2 (E)          Restart  trulicity 0.75 mg/wk -> 1.5 then 3 mg/wk  Titrate monthly as tolerated    Metformin 1000 mg twice a day   Glipizide 5-> 10 mg with meals in the morning but will stop when you start getting low BG    We need annual eye exam and podiatry exam

## 2025-07-01 NOTE — DISCHARGE INSTRUCTIONS
You will receive a phone call if you need antibiotics for stool infection.  Be sure to stay well-hydrated.    Return to the emergency department if you develop severe abdominal pain, severe nausea and vomiting to the point where you are unable to keep down fluids, if you develop chest pain or difficulty breathing, blood in your stool, dizziness or fainting, or if you develop any other new or concerning symptoms as these could be signs of more serious medical illness.  Try to stay well-hydrated.

## 2025-07-01 NOTE — ED INITIAL ASSESSMENT (HPI)
Pt to ED for diarrhea x weeks and lower abd pain starting more recently. Denies bloody/dark stools.

## 2025-07-01 NOTE — ED PROVIDER NOTES
Patient Seen in: [facility] Emergency Department        History  Chief Complaint   Patient presents with    Abdomen/Flank Pain    Diarrhea     Stated Complaint: diarrhea, abdominal pain    Subjective:   HPI            74-year-old female with hypertension, diabetes, MDS, tremors, unilateral nephrectomy, who presents for evaluation of diarrhea and lower abdominal pain.  Diarrhea has been going on for a couple weeks.  It is described as oily, nonbloody.  No nausea or vomiting.  For the past 2 to 3 days she has had pain at the lower abdomen.  No fevers.      Objective:     Past Medical History:    Cancer (HCC)    renal carcinoma    Cataract    Diabetes (HCC)    Essential hypertension    Hearing impairment    High blood pressure    High cholesterol    Hyperlipidemia    MDS (myelodysplastic syndrome) (HCC)    Osteoarthritis    Renal disorder    Visual impairment    glaSSES              Past Surgical History:   Procedure Laterality Date    Colonoscopy N/A 4/2/2023    Procedure: COLONOSCOPY;  Surgeon: Bassam Bueno MD;  Location: [facility] ENDOSCOPY    Hip surgery Right 03/13/2023    right hip nailing    Hysterectomy      Nephrectomy                  Social History     Socioeconomic History    Marital status:    Tobacco Use    Smoking status: Former     Types: Cigarettes    Smokeless tobacco: Never   Vaping Use    Vaping status: Never Used   Substance and Sexual Activity    Alcohol use: Yes     Alcohol/week: 1.0 standard drink of alcohol     Types: 1 Glasses of wine per week     Comment: socially    Drug use: Never     Social Drivers of Health      Received from [facility]    Hunger Vital Sign    Received from [facility]    PRAPARE - Transportation    Received from [facility]    Housing Stability Vital Sign                                Physical Exam    ED Triage Vitals [07/01/25 0837]   /71   Pulse 72   Resp 20   Temp 98.4 °F (36.9 °C)   Temp src Temporal   SpO2 99 %   O2  Device None (Room air)       Current Vitals:   Vital Signs  BP: 148/65  Pulse: 75  Resp: 16  Temp: 98.4 °F (36.9 °C)  Temp src: Temporal  MAP (mmHg): 89    Oxygen Therapy  SpO2: 98 %  O2 Device: None (Room air)            Physical Exam  Vitals and nursing note reviewed.   Constitutional:       Appearance: She is well-developed.   HENT:      Head: Normocephalic and atraumatic.   Eyes:      Extraocular Movements: Extraocular movements intact.   Cardiovascular:      Rate and Rhythm: Normal rate and regular rhythm.      Heart sounds: Normal heart sounds.   Pulmonary:      Effort: Pulmonary effort is normal.      Breath sounds: Normal breath sounds.   Abdominal:      General: There is no distension.      Palpations: Abdomen is soft.      Tenderness: There is abdominal tenderness.      Comments: Mild tenderness to palpation throughout the lower abdomen without focal area of tenderness, rebound or guarding, no rigidity.  Soft ventral hernia present in the upper abdomen   Musculoskeletal:         General: Normal range of motion.      Cervical back: Normal range of motion.   Skin:     General: Skin is warm.   Neurological:      Mental Status: She is alert.      Comments: No focal deficits       Differential diagnosis includes but is not limited to C. difficile, viral illness, electrolyte derangement, dehydration, medication side effect, diverticulitis        ED Course  Labs Reviewed   CBC WITH DIFFERENTIAL WITH PLATELET - Abnormal; Notable for the following components:       Result Value    RBC 2.40 (*)     HGB 8.0 (*)     HCT 25.9 (*)     .9 (*)     MCHC 30.9 (*)     RDW-SD 79.4 (*)     RDW 20.3 (*)     Neutrophil Absolute Prelim 8.01 (*)     Neutrophil Absolute 8.01 (*)     All other components within normal limits   BASIC METABOLIC PANEL (8) - Abnormal; Notable for the following components:    Glucose 162 (*)     All other components within normal limits   URINALYSIS WITH CULTURE REFLEX - Abnormal; Notable for the  following components:    Protein Urine Trace (*)     All other components within normal limits   RBC MORPHOLOGY SCAN - Abnormal; Notable for the following components:    RBC Morphology See morphology below (*)     All other components within normal limits   LIPASE - Normal   HEPATIC FUNCTION PANEL (7) - Normal   C. DIFFICILE(TOXIGENIC)PCR          CT ABDOMEN+PELVIS(CONTRAST ONLY)(CPT=74177)  Result Date: 7/1/2025  CONCLUSION: 1. Diffuse inflammation of the large bowel from the cecum to the rectum. There is circumferential wall thickening, luminal narrowing, and pericolonic fat stranding with ascites. The distribution of inflammation suggests a diffuse colitis possibly C. difficile. 2. Colonic diverticulosis without acute inflammation. 3. There is ascites mainly in the upper quadrants and there is fat stranding around the pancreatic head and around the stomach. This is likely reactive due to the aforementioned findings and conclusion 1 however correlate for possible pancreatitis or gastritis. 4. Post right nephrectomy. No abnormal soft tissue in the right renal fossa. Contralateral left kidney is unremarkable. 5. Post cholecystectomy. No significant biliary ductal dilatation. 6. Stable small fat-containing supraumbilical hernia, which also contains trace ascites. 7. Cord atherosclerosis. 8. Post right hip ORIF. The hardware causes beam hardening artifact limiting assessment of adjacent structures. Electronically Verified and Signed by Attending Radiologist: Eric Santana MD 7/1/2025 11:04 AM Workstation: [employer]                      [facility]             Medical Decision Making  Patient is well-appearing, ambulatory, nontoxic.  CBC with ongoing anemia.  BMP, urinalysis, LFTs and lipase are unremarkable.  Per my independent interpretation CT abdomen pelvis, no free air, other radiology findings noted as above.  C. difficile PCR is pending.  I do feel patient is stable for discharge home, with good hydration, PCP follow-up.   She will get a call from our ER regarding the C. difficile results if she requires antibiotic.    Problems Addressed:  Colitis: complicated acute illness or injury with systemic symptoms  Diarrhea, unspecified type: complicated acute illness or injury with systemic symptoms    Amount and/or Complexity of Data Reviewed  Independent Historian: caregiver     Details: Patient's family member at bedside reports concern for diverticulitis  Labs: ordered. Decision-making details documented in ED Course.  Radiology: ordered and independent interpretation performed. Decision-making details documented in ED Course.        Disposition and Plan     Clinical Impression:  1. Diarrhea, unspecified type    2. Colitis         Disposition:  Discharge  7/1/2025 12:33 pm    Follow-up:  Yennifer Willis MD  [address] [address]  [phone]    Follow up in 1 week(s)      We recommend that you schedule follow up care with a primary care provider within the next three months to obtain basic health screening including reassessment of your blood pressure.      Medications Prescribed:  Discharge Medication List as of 7/1/2025 12:58 PM                Supplementary Documentation:

## 2025-07-15 ENCOUNTER — OFFICE VISIT (OUTPATIENT)
Dept: HEMATOLOGY/ONCOLOGY | Age: 75
End: 2025-07-15
Attending: INTERNAL MEDICINE

## 2025-07-15 ENCOUNTER — APPOINTMENT (OUTPATIENT)
Dept: LAB | Age: 75
End: 2025-07-15

## 2025-07-15 VITALS
RESPIRATION RATE: 16 BRPM | BODY MASS INDEX: 28.08 KG/M2 | WEIGHT: 162.48 LBS | HEART RATE: 76 BPM | TEMPERATURE: 97.4 F | SYSTOLIC BLOOD PRESSURE: 133 MMHG | DIASTOLIC BLOOD PRESSURE: 74 MMHG | OXYGEN SATURATION: 99 %

## 2025-07-15 DIAGNOSIS — D50.0 IRON DEFICIENCY ANEMIA DUE TO CHRONIC BLOOD LOSS: ICD-10-CM

## 2025-07-15 DIAGNOSIS — K52.9 INFLAMMATION OF COLONIC MUCOSA: ICD-10-CM

## 2025-07-15 DIAGNOSIS — D46.9 MDS (MYELODYSPLASTIC SYNDROME)  (CMD): Primary | ICD-10-CM

## 2025-07-15 LAB
ALBUMIN SERPL-MCNC: 3.3 G/DL (ref 3.4–5)
ALBUMIN/GLOB SERPL: 0.8 {RATIO} (ref 1–2.4)
ALP SERPL-CCNC: 199 UNITS/L (ref 45–117)
ALT SERPL-CCNC: 29 UNITS/L
ANION GAP SERPL CALC-SCNC: 15 MMOL/L (ref 7–19)
AST SERPL-CCNC: 18 UNITS/L
BASOPHILS # BLD: 0 K/MCL (ref 0–0.3)
BASOPHILS NFR BLD: 0 %
BILIRUB SERPL-MCNC: 0.6 MG/DL (ref 0.2–1)
BUN SERPL-MCNC: 10 MG/DL (ref 6–20)
BUN/CREAT SERPL: 17 (ref 7–25)
CALCIUM SERPL-MCNC: 9.3 MG/DL (ref 8.4–10.2)
CHLORIDE SERPL-SCNC: 103 MMOL/L (ref 97–110)
CO2 SERPL-SCNC: 25 MMOL/L (ref 21–32)
CREAT SERPL-MCNC: 0.59 MG/DL (ref 0.51–0.95)
DEPRECATED RDW RBC: 81 FL (ref 39–50)
EGFRCR SERPLBLD CKD-EPI 2021: >90 ML/MIN/{1.73_M2}
EOSINOPHIL # BLD: 0.1 K/MCL (ref 0–0.5)
EOSINOPHIL NFR BLD: 2 %
ERYTHROCYTE [DISTWIDTH] IN BLOOD: 21.1 % (ref 11–15)
FASTING DURATION TIME PATIENT: 0 HOURS (ref 0–999)
FERRITIN SERPL-MCNC: 202 NG/ML (ref 8–252)
FOLATE SERPL-MCNC: >24 NG/ML
GLOBULIN SER-MCNC: 4.1 G/DL (ref 2–4)
GLUCOSE SERPL-MCNC: 275 MG/DL (ref 70–99)
HCT VFR BLD CALC: 25 % (ref 36–46.5)
HGB BLD-MCNC: 7.6 G/DL (ref 12–15.5)
IMM GRANULOCYTES # BLD AUTO: 0.1 K/MCL (ref 0–0.2)
IMM GRANULOCYTES # BLD: 1 %
IRON SATN MFR SERPL: 15 % (ref 15–45)
IRON SERPL-MCNC: 38 MCG/DL (ref 50–170)
LDH SERPL L TO P-CCNC: 286 UNITS/L (ref 82–240)
LYMPHOCYTES # BLD: 1.3 K/MCL (ref 1–4)
LYMPHOCYTES NFR BLD: 15 %
MCH RBC QN AUTO: 33.5 PG (ref 26–34)
MCHC RBC AUTO-ENTMCNC: 30.4 G/DL (ref 32–36.5)
MCV RBC AUTO: 110.1 FL (ref 78–100)
MONOCYTES # BLD: 0.6 K/MCL (ref 0.3–0.9)
MONOCYTES NFR BLD: 7 %
NEUTROPHILS # BLD: 6.8 K/MCL (ref 1.8–7.7)
NEUTROPHILS NFR BLD: 75 %
NRBC BLD MANUAL-RTO: 1 /100 WBC
PLATELET # BLD AUTO: 449 K/MCL (ref 140–450)
POTASSIUM SERPL-SCNC: 4 MMOL/L (ref 3.4–5.1)
PROT SERPL-MCNC: 7.4 G/DL (ref 6.4–8.2)
RBC # BLD: 2.27 MIL/MCL (ref 4–5.2)
SODIUM SERPL-SCNC: 139 MMOL/L (ref 135–145)
TIBC SERPL-MCNC: 254 MCG/DL (ref 250–450)
VIT B12 SERPL-MCNC: >2000 PG/ML (ref 211–911)
WBC # BLD: 8.9 K/MCL (ref 4.2–11)

## 2025-07-15 PROCEDURE — 99211 OFF/OP EST MAY X REQ PHY/QHP: CPT

## 2025-07-15 PROCEDURE — 83550 IRON BINDING TEST: CPT | Performed by: INTERNAL MEDICINE

## 2025-07-15 PROCEDURE — 85025 COMPLETE CBC W/AUTO DIFF WBC: CPT | Performed by: INTERNAL MEDICINE

## 2025-07-15 PROCEDURE — 82746 ASSAY OF FOLIC ACID SERUM: CPT | Performed by: CLINICAL MEDICAL LABORATORY

## 2025-07-15 PROCEDURE — 99214 OFFICE O/P EST MOD 30 MIN: CPT | Performed by: INTERNAL MEDICINE

## 2025-07-15 PROCEDURE — 82728 ASSAY OF FERRITIN: CPT | Performed by: INTERNAL MEDICINE

## 2025-07-15 PROCEDURE — 84238 ASSAY NONENDOCRINE RECEPTOR: CPT | Performed by: CLINICAL MEDICAL LABORATORY

## 2025-07-15 PROCEDURE — 83615 LACTATE (LD) (LDH) ENZYME: CPT | Performed by: INTERNAL MEDICINE

## 2025-07-15 PROCEDURE — 80053 COMPREHEN METABOLIC PANEL: CPT | Performed by: INTERNAL MEDICINE

## 2025-07-15 PROCEDURE — 83540 ASSAY OF IRON: CPT | Performed by: INTERNAL MEDICINE

## 2025-07-15 PROCEDURE — 3075F SYST BP GE 130 - 139MM HG: CPT | Performed by: INTERNAL MEDICINE

## 2025-07-15 PROCEDURE — 36415 COLL VENOUS BLD VENIPUNCTURE: CPT | Performed by: INTERNAL MEDICINE

## 2025-07-15 PROCEDURE — 82607 VITAMIN B-12: CPT | Performed by: CLINICAL MEDICAL LABORATORY

## 2025-07-15 PROCEDURE — 3078F DIAST BP <80 MM HG: CPT | Performed by: INTERNAL MEDICINE

## 2025-07-15 ASSESSMENT — PATIENT HEALTH QUESTIONNAIRE - PHQ9: SUM OF ALL RESPONSES TO PHQ9 QUESTIONS 1 AND 2: 0

## 2025-07-15 ASSESSMENT — PAIN SCALES - GENERAL: PAINLEVEL_OUTOF10: 3

## 2025-07-18 LAB — STFR SERPL-MCNC: 3.7 MG/L (ref 1.9–4.4)

## 2025-07-28 NOTE — ANESTHESIA POSTPROCEDURE EVALUATION
Patient: Laurita Drake    Procedure Summary       Date: 07/28/25 Room / Location: [facility] ENDOSCOPY 05 / [facility] ENDOSCOPY    Anesthesia Start: 1142 Anesthesia Stop: 1211    Procedures:       ESOPHAGOGASTRODUODENOSCOPY (EGD)      COLONOSCOPY Diagnosis: (diverticulosis; esophageal varices)    Surgeons: Jace iDaz MD Anesthesiologist: Latasha Polanco CRNA    Anesthesia Type: MAC, general ASA Status: 3            Anesthesia Type: MAC, general    Vitals Value Taken Time   /56 07/28/25 12:10   Temp 98.6 °F (37 °C) 07/28/25 12:10   Pulse 80 07/28/25 12:10   Resp 23 07/28/25 12:10   SpO2 97 % 07/28/25 12:10   Vitals shown include unfiled device data.    [facility] AN Post Evaluation:   Patient Evaluated in PACU  Patient Participation: complete - patient participated  Level of Consciousness: sleepy but conscious  Pain Score: 0  Pain Management: adequate  Airway Patency:patent  Dental exam unchanged from preop  Yes    Nausea/Vomiting: none  Cardiovascular Status: acceptable  Respiratory Status: acceptable and room air  Postoperative Hydration acceptable      Latasha Polanco CRNA  7/28/2025 12:11 PM

## 2025-07-28 NOTE — ANESTHESIA PREPROCEDURE EVALUATION
Anesthesia PreOp Note    HPI:     Laurita Drake is a 74 year old female who presents for preoperative consultation requested by: Jace Diaz MD    Date of Surgery: 7/24/2025 - 7/28/2025    Procedure(s):  ESOPHAGOGASTRODUODENOSCOPY (EGD)  COLONOSCOPY  Indication: none given    Relevant Problems   No relevant active problems       NPO:  Last Liquid Consumption Date: 07/28/25  Last Liquid Consumption Time: 0400 (bowel prep)        Last Liquid Consumption Date: 07/28/25          History Review:  Patient Active Problem List    Diagnosis Date Noted    Other ascites 07/25/2025    Diarrhea, unspecified type 07/25/2025    Perforated viscus 07/25/2025    Macrocytic anemia 07/24/2025    Rectal bleeding 04/01/2023    Stress fracture 03/13/2023    Type 2 diabetes mellitus with hyperglycemia, without long-term current use of insulin (HCC) 10/24/2019    Hypertension associated with type 2 diabetes mellitus (HCC) 10/24/2019    Dyslipidemia associated with type 2 diabetes mellitus (HCC) 10/24/2019       Past Medical History[1]    Past Surgical History[2]    Prescriptions Prior to Admission[3]  Current Medications and Prescriptions Ordered in Epic[4]    Allergies[5]    Family History[6]  Social Hx on file[7]    Available pre-op labs reviewed.  Lab Results   Component Value Date    WBC 7.1 07/28/2025    RBC 2.41 (L) 07/28/2025    HGB 7.7 (L) 07/28/2025    HCT 24.6 (L) 07/28/2025    .1 (H) 07/28/2025    MCH 32.0 07/28/2025    MCHC 31.3 07/28/2025    RDW 20.5 (H) 07/28/2025    .0 07/28/2025     Lab Results   Component Value Date     07/28/2025    K 3.6 07/28/2025     07/28/2025    CO2 25.0 07/28/2025    BUN 7 (L) 07/28/2025    CREATSERUM 0.65 07/28/2025     (H) 07/28/2025    PGLU 187 (H) 07/28/2025    CA 9.1 07/28/2025     Lab Results   Component Value Date    INR 1.22 (H) 07/25/2025       Vital Signs:  Body mass index is 27.93 kg/m².   height is 1.626 m (5' 4\") and weight is 73.8 kg (162  lb 11.2 oz). Her oral temperature is 97.3 °F (36.3 °C). Her blood pressure is 145/70 and her pulse is 75. Her respiration is 18 and oxygen saturation is 96%.   Vitals:    07/27/25 1156 07/27/25 2127 07/28/25 0612 07/28/25 0854   BP: 143/67 148/70 153/73 145/70   Pulse: 66 76 75    Resp: 18 18 18    Temp: 97.5 °F (36.4 °C) 97.7 °F (36.5 °C) 97.9 °F (36.6 °C) 97.3 °F (36.3 °C)   TempSrc: Oral Oral Temporal Oral   SpO2: 100% 100% 95% 96%   Weight:       Height:            Anesthesia Evaluation     Patient summary reviewed and Nursing notes reviewed    Airway   Mallampati: II  TM distance: >3 FB  Neck ROM: full  Dental    (+) upper dentures    Pulmonary - negative ROS and normal exam   Cardiovascular - normal exam    Neuro/Psych - negative ROS     GI/Hepatic/Renal    (+) chronic renal disease (renal carcinoma sp nephrectomy)    Endo/Other    (+) blood dyscrasia  Abdominal                  Anesthesia Plan:   ASA:  3  Plan:   MAC and general  Informed Consent Plan and Risks Discussed With:  Patient  Discussed plan with:  Surgeon      I have informed Laurita Drake and/or legal guardian or family member of the nature of the anesthetic plan, benefits, risks including possible dental damage if relevant, major complications, and any alternative forms of anesthetic management.   All of the patient's questions were answered to the best of my ability. The patient desires the anesthetic management as planned.  Latasha Polanco CRNA  7/28/2025 11:06 AM  Present on Admission:  **None**           [1]   Past Medical History:   Cancer (HCC)    renal carcinoma    Cataract    Diabetes (HCC)    Essential hypertension    Hearing impairment    High blood pressure    High cholesterol    Hyperlipidemia    MDS (myelodysplastic syndrome) (HCC)    Osteoarthritis    Renal disorder    Visual impairment    glaSSES   [2]   Past Surgical History:  Procedure Laterality Date    Colonoscopy N/A 4/2/2023    Procedure: COLONOSCOPY;  Surgeon: Myles  MD Bassam;  Location: [facility] ENDOSCOPY    Hip surgery Right 03/13/2023    right hip nailing    Hysterectomy      Nephrectomy     [3]   Medications Prior to Admission   Medication Sig Dispense Refill Last Dose/Taking    pregabalin 150 MG Oral Cap Take 50 mg by mouth 2 (two) times daily.   7/24/2025    DULoxetine 60 MG Oral Cap DR Particles Take 1 capsule (60 mg total) by mouth daily.   7/24/2025    metFORMIN HCl 1000 MG Oral Tab Take 1 tablet (1,000 mg total) by mouth 2 (two) times daily with meals. 180 tablet 1 7/24/2025    Continuous Glucose Sensor (DEXCOM G7 SENSOR) Does not apply Misc 1 each Every 10 days. Use as directed every 10 days 3 each 10 7/24/2025    cyanocobalamin 1000 MCG Oral Tab Take 1 tablet (1,000 mcg total) by mouth in the morning.   7/24/2025    Cholecalciferol (VITAMIN D3) 25 MCG (1000 UT) Oral Cap Take 1 tablet by mouth in the morning.   7/24/2025    valsartan 160 MG Oral Tab Take 1 tablet (160 mg total) by mouth in the morning.   7/24/2025    Calcium Carbonate 600 MG Oral Tab Take 1 tablet BID   7/24/2025    folic acid 1 MG Oral Tab Take 1 tablet (1 mg total) by mouth in the morning.   7/24/2025    Lovastatin 40 MG Oral Tab Take 1 tablet (40 mg total) by mouth nightly.   7/24/2025    Propranolol HCl 20 MG Oral Tab Take 3 tablets (60 mg total) by mouth in the morning and 3 tablets (60 mg total) before bedtime.   7/24/2025   [4]   Current Facility-Administered Medications Ordered in Epic   Medication Dose Route Frequency Provider Last Rate Last Admin    furosemide (Lasix) tab 20 mg  20 mg Oral Daily Srini Hernandez MD   20 mg at 07/27/25 1532    spironolactone (Aldactone) tab 25 mg  25 mg Oral Daily Srini Hernandez MD   25 mg at 07/27/25 1532    insulin aspart (NovoLOG) 100 Units/mL FlexPen 1-5 Units  1-5 Units Subcutaneous TID CC and HS Roshan Dawson, DO   1 Units at 07/28/25 0956    heparin (Porcine) 5000 UNIT/ML injection 5,000 Units  5,000 Units Subcutaneous Q8H [facility] Ana Gonzalez MD   5,000  Units at 07/27/25 2131    acetaminophen (Tylenol Extra Strength) tab 500 mg  500 mg Oral Q4H PRN Ana Gonzalez MD        morphINE PF 2 MG/ML injection 1 mg  1 mg Intravenous Q2H PRN Ana Gonzalez MD        Or    morphINE PF 2 MG/ML injection 2 mg  2 mg Intravenous Q2H PRAna Gonzalez MD   2 mg at 07/25/25 1414    Or    morphINE PF 4 MG/ML injection 4 mg  4 mg Intravenous Q2H PRN Ana Gonzalez MD        acetaminophen (Tylenol) tab 650 mg  650 mg Oral Q4H PRN Ana Gonzalez MD        Or    HYDROcodone-acetaminophen (Norco) 5-325 MG per tab 1 tablet  1 tablet Oral Q4H PRAna Gonzalez MD   1 tablet at 07/27/25 2131    Or    HYDROcodone-acetaminophen (Norco) 5-325 MG per tab 2 tablet  2 tablet Oral Q4H PRAna Gonzalez MD   2 tablet at 07/27/25 0041    melatonin tab 3 mg  3 mg Oral Nightly PRN Ana Gonzalez MD        ondansetron (Zofran) 4 MG/2ML injection 4 mg  4 mg Intravenous Q6H PRAna Gonzalez MD        metoclopramide (Reglan) 5 mg/mL injection 10 mg  10 mg Intravenous Q8H PRN Ana Gonzalez MD        atorvastatin (Lipitor) tab 10 mg  10 mg Oral Nightly Clarke Tripp MD   10 mg at 07/27/25 2131    propranolol (Inderal) tab 60 mg  60 mg Oral BID Clarke Tripp MD   60 mg at 07/27/25 2131    NIFEdipine ER (Procardia-XL) 24 hr tab 30 mg  30 mg Oral QAM Clarke Tripp MD   30 mg at 07/27/25 0858    losartan (Cozaar) tab 100 mg  100 mg Oral Daily Clarke Tripp MD   100 mg at 07/27/25 0857    DULoxetine (Cymbalta) DR cap 60 mg  60 mg Oral Daily Long, Romero, DO   60 mg at 07/27/25 0857    folic acid (Folvite) tab 1 mg  1 mg Oral Daily Long, Romero, DO   1 mg at 07/27/25 0858    pregabalin (Lyrica) cap 50 mg  50 mg Oral BID Long, Romero, DO   50 mg at 07/27/25 2131    glucose (Dex4) 15 GM/59ML oral liquid 15 g  15 g Oral Q15 Min PRN Long, Romero, DO        Or    glucose (Glutose) 40% oral gel 15 g  15 g Oral Q15 Min PRN Long, Romero, DO        Or    glucose-vitamin C (Dex-4) chewable tab 4 tablet   4 tablet Oral Q15 Min PRN Long, Romero, DO        Or    dextrose 50% injection 50 mL  50 mL Intravenous Q15 Min PRN Long, Romero, DO        Or    glucose (Dex4) 15 GM/59ML oral liquid 30 g  30 g Oral Q15 Min PRN Long, Romero, DO        Or    glucose (Glutose) 40% oral gel 30 g  30 g Oral Q15 Min PRN Long, Romero, DO        Or    glucose-vitamin C (Dex-4) chewable tab 8 tablet  8 tablet Oral Q15 Min PRN Long, Romero, DO         No current Epic-ordered outpatient medications on file.   [5]   Allergies  Allergen Reactions    Ciprofloxacin HIVES    Glimepiride OTHER (SEE COMMENTS)     Severe hypoglycemia   [6]   Family History  Problem Relation Age of Onset    Diabetes Mother     No Known Problems Daughter    [7]   Social History  Socioeconomic History    Marital status:    Tobacco Use    Smoking status: Former     Types: Cigarettes    Smokeless tobacco: Never   Vaping Use    Vaping status: Never Used   Substance and Sexual Activity    Alcohol use: Yes     Alcohol/week: 1.0 standard drink of alcohol     Types: 1 Glasses of wine per week     Comment: socially    Drug use: Never

## (undated) NOTE — LETTER
Mery Rayo [address], [place], [place]  [address]  INFORMED CONSENT FOR TRANSFUSION OF BLOOD OR BLOOD PRODUCTS  My physician has informed me of the nature, purpose, benefits and risks of transfusion for blood and blood components that he/she may deem necessary during my treatment or hospitalization. He/she has also discussed alternatives to receiving blood from the voluntary blood supply with me, such as self-donation (autologous) and directed donation (blood donated by family or friends to be used specifically for me). I further understand that while the [address] [place] [address] will attempt to supply any autologous or directed donor blood prior to transfusion of blood from the routine blood supply, medical circumstances may require that other or additional blood components may be required for my care. In giving consent, I acknowledge that my physician has also informed me that despite careful screening and testing in accordance with national and regional regulations, there is still a small risk of transmission of infectious agents including hepatitis, HIV-1/2, cytomegalovirus and other viruses or diseases as yet unknown for which licensed definitive screening tests do not currently exist. Additionally, my physician has informed me of the potential for transfusion reactions not related to an infectious agent. [  ]  Check here for Recurring Outpatient Transfusion Therapy (valid for 1 year) In addition to the above, my physician has informed me that I shall receive numerous transfusions over a period of time and that these can lead to other increased risks. I hereby authorize the transfusion of blood and/or blood products to me as deemed necessary and ordered by physicians participating in my care.  My physician has given me the opportunity to ask questions and any questions asked have been answered to my satisfaction  __________________________________________ ______________________________________________  (Signature of Patient)                                                            (Responsible party in case of Minor,                                                                                                 Incompetent, or unconscious Patient)  ___________________________________________       ________ ______________________________________  (Relationship to Patient)                                                       (Signature of Witness)  ______________________________________________________________________________________________   (Date)                                                                           (Time)  REFUSAL OF CONSENT FOR BLOOD TRANSFUSIONS   Sign only if Refusing   [  ] I understand refusal of blood or blood products as deemed necessary by my physician may have serious consequences to my condition to include possible death.  I hereby assume responsibility for my refusal and release the hospital, its personnel, and my physicians from any responsibility for the consequences of my refusal.    ________________________________________________________________________________  (Signature of Patient)                                                         (Responsible Party/Relationship to Patient)    ________________________________________________________________________________  (Signature of Witness)                                                       (Date/Time)     Patient Name: Jessica Milan     : 10/18/1950                 Printed: 2023      Medical Record #: [id number]                                 Page 1 of 1

## (undated) NOTE — ED AVS SNAPSHOT
Vidya Chowdhury   MRN: [id number]    Department:  [facility] Emergency Department   Date of Visit:  10/14/2019           Disclosure     Insurance plans vary and the physician(s) referred by the ER may not be covered by your plan.  Please contact within the next three months to obtain basic health screening including reassessment of your blood pressure.     IF THERE IS ANY CHANGE OR WORSENING OF YOUR CONDITION, CALL YOUR PRIMARY CARE PHYSICIAN AT ONCE OR RETURN IMMEDIATELY TO THE EMERGENCY DEPARTMEN

## (undated) NOTE — LETTER
Mery Rayo [address], [place], [place]  [address]  INFORMED CONSENT FOR TRANSFUSION OF BLOOD OR BLOOD PRODUCTS  My physician has informed me of the nature, purpose, benefits and risks of transfusion for blood and blood components that he/she may deem necessary during my treatment or hospitalization. He/she has also discussed alternatives to receiving blood from the voluntary blood supply with me, such as self-donation (autologous) and directed donation (blood donated by family or friends to be used specifically for me). I further understand that while the [address] [place] [address] will attempt to supply any autologous or directed donor blood prior to transfusion of blood from the routine blood supply, medical circumstances may require that other or additional blood components may be required for my care. In giving consent, I acknowledge that my physician has also informed me that despite careful screening and testing in accordance with national and regional regulations, there is still a small risk of transmission of infectious agents including hepatitis, HIV-1/2, cytomegalovirus and other viruses or diseases as yet unknown for which licensed definitive screening tests do not currently exist. Additionally, my physician has informed me of the potential for transfusion reactions not related to an infectious agent. [  ]  Check here for Recurring Outpatient Transfusion Therapy (valid for 1 year) In addition to the above, my physician has informed me that I shall receive numerous transfusions over a period of time and that these can lead to other increased risks. I hereby authorize the transfusion of blood and/or blood products to me as deemed necessary and ordered by physicians participating in my care.  My physician has given me the opportunity to ask questions and any questions asked have been answered to my satisfaction  __________________________________________ ______________________________________________  (Signature of Patient)                                                            (Responsible party in case of Minor,                                                                                                 Incompetent, or unconscious Patient)  ___________________________________________       ________ ______________________________________  (Relationship to Patient)                                                       (Signature of Witness)  ______________________________________________________________________________________________   (Date)                                                                           (Time)  REFUSAL OF CONSENT FOR BLOOD TRANSFUSIONS   Sign only if Refusing   [  ] I understand refusal of blood or blood products as deemed necessary by my physician may have serious consequences to my condition to include possible death.  I hereby assume responsibility for my refusal and release the hospital, its personnel, and my physicians from any responsibility for the consequences of my refusal.    ________________________________________________________________________________  (Signature of Patient)                                                         (Responsible Party/Relationship to Patient)    ________________________________________________________________________________  (Signature of Witness)                                                       (Date/Time)     Patient Name: Rosi Ham     : 10/18/1950                 Printed: 2023      Medical Record #: [id number]                                 Page 1 of 1

## (undated) NOTE — LETTER
[address]  Authorization for Surgical Operation and Procedure                                                                                           1. I hereby authorize Colonoscopy with control of bleeding, possible biopsy and possible polypectomy, my physician and his/her assistants (if applicable), which may include medical students, residents, and/or fellows, to perform the following surgical operation/ procedure and administer such anesthesia as may be determined necessary by my physician: Operation/Procedure name (s)  on [address] [place] [address]   2. I recognize that during the surgical operation/procedure, unforeseen conditions may necessitate additional or different procedures than those listed above. I, therefore, further authorize and request that the above-named surgeon, assistants, or designees perform such procedures as are, in their judgment, necessary and desirable. 3.   My surgeon/physician has discussed prior to my surgery the potential benefits, risks and side effects of this procedure; the likelihood of achieving goals; and potential problems that might occur during recuperation. They also discussed reasonable alternatives to the procedure, including risks, benefits, and side effects related to the alternatives and risks related to not receiving this procedure. I have had all my questions answered and I acknowledge that no guarantee has been made as to the result that may be obtained. 4.   Should the need arise during my operation/procedure, which includes change of level of care prior to discharge, I also consent to the administration of blood and/or blood products. Further, I understand that despite careful testing and screening of blood or blood products by collecting agencies, I may still be subject to ill effects as a result of receiving a blood transfusion and/or blood products.   The following are some, but not all, of the potential risks that can occur: fever and allergic reactions, hemolytic reactions, transmission of diseases such as Hepatitis, AIDS and Cytomegalovirus (CMV) and fluid overload. In the event that I wish to have an autologous transfusion of my own blood, or a directed donor transfusion, I will discuss this with my physician. Check only if Refusing Blood or Blood Products  I understand refusal of blood or blood products as deemed necessary by my physician may have serious consequences to my condition to include possible death. I hereby assume responsibility for my refusal and release the hospital, its personnel, and my physicians from any responsibility for the consequences of my refusal.    o  Refuse   5. I authorize the use of any specimen, organs, tissues, body parts or foreign objects that may be removed from my body during the operation/procedure for diagnosis, research or teaching purposes and their subsequent disposal by hospital authorities. I also authorize the release of specimen test results and/or written reports to my treating physician on the hospital medical staff or other referring or consulting physicians involved in my care, at the discretion of the Pathologist or my treating physician. 6.   I consent to the photographing or videotaping of the operations or procedures to be performed, including appropriate portions of my body for medical, scientific, or educational purposes, provided my identity is not revealed by the pictures or by descriptive texts accompanying them. If the procedure has been photographed/videotaped, the surgeon will obtain the original picture, image, videotape or CD. The hospital will not be responsible for storage, release or maintenance of the picture, image, tape or CD.    7.   I consent to the presence of a  or observers in the operating room as deemed necessary by my physician or their designees.     8.   I recognize that in the event my procedure results in extended X-Ray/fluoroscopy time, I may develop a skin reaction. 9. If I have a Do Not Attempt Resuscitation (DNAR) order in place, that status will be suspended while in the operating room, procedural suite, and during the recovery period unless otherwise explicitly stated by me (or a person authorized to consent on my behalf). The surgeon or my attending physician will determine when the applicable recovery period ends for purposes of reinstating the DNAR order. 10. Patients having a sterilization procedure: I understand that if the procedure is successful the results will be permanent and it will therefore be impossible for me to inseminate, conceive, or bear children. I also understand that the procedure is intended to result in sterility, although the result has not been guaranteed. 11. I acknowledge that my physician has explained sedation/analgesia administration to me including the risk and benefits I consent to the administration of sedation/analgesia as may be necessary or desirable in the judgment of my physician. I CERTIFY THAT I HAVE READ AND FULLY UNDERSTAND THE ABOVE CONSENT TO OPERATION and/or OTHER PROCEDURE.     _________________________________________ _________________________________     ___________________________________  Signature of Patient     Signature of Responsible Person                   Printed Name of Responsible Person                              _________________________________________ ______________________________        ___________________________________  Signature of Witness         Date  Time         Relationship to Patient    STATEMENT OF PHYSICIAN My signature below affirms that prior to the time of the procedure; I have explained to the patient and/or his/her legal representative, the risks and benefits involved in the proposed treatment and any reasonable alternative to the proposed treatment.  I have also explained the risks and benefits involved in refusal of the proposed treatment and alternatives to the proposed treatment and have answered the patient's questions.  If I have a significant financial interest in a co-management agreement or a significant financial interest in any product or implant, or other significant relationship used in this procedure/surgery, I have disclosed this and had a discussion with my patient.     _______________________________________________________________ _____________________________  Hilary Velásquez of Physician)                                                                                         (Date)                                   (Time)  Patient Name: Chato Black    : 10/18/1950   Printed: 2023      Medical Record #: [id number]                                              Page 1 of 1

## (undated) NOTE — LETTER
[address] [place] [address] [place] [address], [place]  Authorization for Surgical Operation and Procedure                                                                                           1. I hereby authorize Roselyn Perkins MD, my physician and his/her assistants (if applicable), which may include medical students, residents, and/or fellows, to perform the following surgical operation/ procedure and administer such anesthesia as may be determined necessary by my physician: Operation/Procedure name (s) COLONOSCOPY on [facility]   2. I recognize that during the surgical operation/procedure, unforeseen conditions may necessitate additional or different procedures than those listed above. I, therefore, further authorize and request that the above-named surgeon, assistants, or designees perform such procedures as are, in their judgment, necessary and desirable. 3.   My surgeon/physician has discussed prior to my surgery the potential benefits, risks and side effects of this procedure; the likelihood of achieving goals; and potential problems that might occur during recuperation. They also discussed reasonable alternatives to the procedure, including risks, benefits, and side effects related to the alternatives and risks related to not receiving this procedure. I have had all my questions answered and I acknowledge that no guarantee has been made as to the result that may be obtained. 4.   Should the need arise during my operation/procedure, which includes change of level of care prior to discharge, I also consent to the administration of blood and/or blood products. Further, I understand that despite careful testing and screening of blood or blood products by collecting agencies, I may still be subject to ill effects as a result of receiving a blood transfusion and/or blood products.   The following are some, but not all, of the potential risks that can occur: fever and allergic reactions, hemolytic reactions, transmission of diseases such as Hepatitis, AIDS and Cytomegalovirus (CMV) and fluid overload. In the event that I wish to have an autologous transfusion of my own blood, or a directed donor transfusion, I will discuss this with my physician. Check only if Refusing Blood or Blood Products  I understand refusal of blood or blood products as deemed necessary by my physician may have serious consequences to my condition to include possible death. I hereby assume responsibility for my refusal and release the hospital, its personnel, and my physicians from any responsibility for the consequences of my refusal.    o  Refuse   5. I authorize the use of any specimen, organs, tissues, body parts or foreign objects that may be removed from my body during the operation/procedure for diagnosis, research or teaching purposes and their subsequent disposal by hospital authorities. I also authorize the release of specimen test results and/or written reports to my treating physician on the hospital medical staff or other referring or consulting physicians involved in my care, at the discretion of the Pathologist or my treating physician. 6.   I consent to the photographing or videotaping of the operations or procedures to be performed, including appropriate portions of my body for medical, scientific, or educational purposes, provided my identity is not revealed by the pictures or by descriptive texts accompanying them. If the procedure has been photographed/videotaped, the surgeon will obtain the original picture, image, videotape or CD. The hospital will not be responsible for storage, release or maintenance of the picture, image, tape or CD.    7.   I consent to the presence of a  or observers in the operating room as deemed necessary by my physician or their designees.     8.   I recognize that in the event my procedure results in extended X-Ray/fluoroscopy time, I may develop a skin reaction. 9. If I have a Do Not Attempt Resuscitation (DNAR) order in place, that status will be suspended while in the operating room, procedural suite, and during the recovery period unless otherwise explicitly stated by me (or a person authorized to consent on my behalf). The surgeon or my attending physician will determine when the applicable recovery period ends for purposes of reinstating the DNAR order. 10. Patients having a sterilization procedure: I understand that if the procedure is successful the results will be permanent and it will therefore be impossible for me to inseminate, conceive, or bear children. I also understand that the procedure is intended to result in sterility, although the result has not been guaranteed. 11. I acknowledge that my physician has explained sedation/analgesia administration to me including the risk and benefits I consent to the administration of sedation/analgesia as may be necessary or desirable in the judgment of my physician. I CERTIFY THAT I HAVE READ AND FULLY UNDERSTAND THE ABOVE CONSENT TO OPERATION and/or OTHER PROCEDURE.     _________________________________________ _________________________________     ___________________________________  Signature of Patient     Signature of Responsible Person                   Printed Name of Responsible Person                              _________________________________________ ______________________________        ___________________________________  Signature of Witness         Date  Time         Relationship to Patient    STATEMENT OF PHYSICIAN My signature below affirms that prior to the time of the procedure; I have explained to the patient and/or his/her legal representative, the risks and benefits involved in the proposed treatment and any reasonable alternative to the proposed treatment.  I have also explained the risks and benefits involved in refusal of the proposed treatment and alternatives to the proposed treatment and have answered the patient's questions.  If I have a significant financial interest in a co-management agreement or a significant financial interest in any product or implant, or other significant relationship used in this procedure/surgery, I have disclosed this and had a discussion with my patient.     _______________________________________________________________ _____________________________  Carlitos Gallsamy of Physician)                                                                                         (Date)                                   (Time)  Patient Name: Xavi Hairston    : 10/18/1950   Printed: 2023      Medical Record #: [id number]                                              Page 1 of 1